# Patient Record
Sex: FEMALE | Race: WHITE | NOT HISPANIC OR LATINO | Employment: UNEMPLOYED | URBAN - METROPOLITAN AREA
[De-identification: names, ages, dates, MRNs, and addresses within clinical notes are randomized per-mention and may not be internally consistent; named-entity substitution may affect disease eponyms.]

---

## 2023-04-06 ENCOUNTER — TELEPHONE (OUTPATIENT)
Dept: PRIMARY CARE | Facility: CLINIC | Age: 83
End: 2023-04-06
Payer: COMMERCIAL

## 2023-04-06 NOTE — TELEPHONE ENCOUNTER
Rx Refill Request Telephone Encounter    Name:  Марина Lozano  :  [id number]  Medication Name:  hydrochlorothiazide 12.5    [facility]   Date of last appointment:    Date of next appointment:    Best number to reach patient:            Rx Refill Request Telephone Encounter    Name:  Марина Lozano  :  [id number]  Medication Name:  omeprazole 20 mg     [facility]   Date of last appointment:    Date of next appointment:  /  Best number to reach patient:      Rx Refill Request Telephone Encounter    Name:  Марина Lozano  :  [id number]  Medication Name:  pravastatin 20 mg     [facility]  Date of last appointment:    Date of next appointment:    Best number to reach patient:            Rx Refill Request Telephone Encounter    Name:  Марина Lozano  :  [id number]  Medication Name:  amlodipine 10-20 mg       [facility]   Date of last appointment:    Date of next appointment:    Best number to reach patient:

## 2023-04-12 DIAGNOSIS — K29.60 REFLUX GASTRITIS: ICD-10-CM

## 2023-04-12 DIAGNOSIS — I10 HYPERTENSION, UNSPECIFIED TYPE: Primary | ICD-10-CM

## 2023-04-12 DIAGNOSIS — E78.5 HYPERLIPIDEMIA, UNSPECIFIED HYPERLIPIDEMIA TYPE: ICD-10-CM

## 2023-04-12 RX ORDER — AMLODIPINE AND BENAZEPRIL HYDROCHLORIDE 10; 20 MG/1; MG/1
1 CAPSULE ORAL DAILY
Qty: 30 CAPSULE | Refills: 11 | Status: SHIPPED | OUTPATIENT
Start: 2023-04-12 | End: 2023-04-18 | Stop reason: SDUPTHER

## 2023-04-12 RX ORDER — OMEPRAZOLE 20 MG/1
20 TABLET, DELAYED RELEASE ORAL DAILY
Qty: 30 TABLET | Refills: 11 | COMMUNITY
Start: 2023-04-12 | End: 2023-04-18 | Stop reason: SDUPTHER

## 2023-04-12 RX ORDER — PRAVASTATIN SODIUM 20 MG/1
20 TABLET ORAL DAILY
Qty: 30 TABLET | Refills: 5 | Status: SHIPPED | OUTPATIENT
Start: 2023-04-12 | End: 2023-04-18 | Stop reason: SDUPTHER

## 2023-04-12 RX ORDER — HYDROCHLOROTHIAZIDE 12.5 MG/1
12.5 TABLET ORAL DAILY
Qty: 30 TABLET | Refills: 0 | Status: SHIPPED | OUTPATIENT
Start: 2023-04-12 | End: 2023-04-18 | Stop reason: SDUPTHER

## 2023-04-12 NOTE — PROGRESS NOTES
Patient requested refills on medications to be sent to [facility]. She has follow up appointment scheduled 04/18/2023.

## 2023-04-17 PROBLEM — C14.0 THROAT CANCER (MULTI): Status: ACTIVE | Noted: 2023-04-17

## 2023-04-17 PROBLEM — L29.9 PRURITUS: Status: ACTIVE | Noted: 2023-04-17

## 2023-04-17 PROBLEM — N17.9 AKI (ACUTE KIDNEY INJURY) (CMS-HCC): Status: ACTIVE | Noted: 2023-04-17

## 2023-04-17 PROBLEM — E78.5 HYPERLIPEMIA: Status: ACTIVE | Noted: 2023-04-17

## 2023-04-17 PROBLEM — K21.9 GASTRO-ESOPHAGEAL REFLUX: Status: ACTIVE | Noted: 2023-04-17

## 2023-04-17 PROBLEM — E55.9 VITAMIN D DEFICIENCY: Status: ACTIVE | Noted: 2023-04-17

## 2023-04-17 PROBLEM — I10 HYPERTENSION: Status: ACTIVE | Noted: 2023-04-17

## 2023-04-17 PROBLEM — E11.9 TYPE 2 DIABETES MELLITUS (MULTI): Status: ACTIVE | Noted: 2023-04-17

## 2023-04-17 RX ORDER — BLOOD-GLUCOSE METER
EACH MISCELLANEOUS
COMMUNITY
End: 2024-04-18 | Stop reason: SDUPTHER

## 2023-04-17 RX ORDER — CHOLECALCIFEROL (VITAMIN D3) 50 MCG
1 TABLET ORAL EVERY OTHER DAY
COMMUNITY

## 2023-04-17 RX ORDER — METFORMIN HYDROCHLORIDE 500 MG/1
500 TABLET ORAL
COMMUNITY
End: 2023-12-12

## 2023-04-17 RX ORDER — GLIPIZIDE 5 MG/1
1 TABLET ORAL 2 TIMES DAILY
COMMUNITY
Start: 2020-03-09 | End: 2024-03-27

## 2023-04-17 RX ORDER — LANCETS 33 GAUGE
EACH MISCELLANEOUS
COMMUNITY
Start: 2023-01-17 | End: 2024-04-18 | Stop reason: SDUPTHER

## 2023-04-17 RX ORDER — LANCETS 30 GAUGE
EACH MISCELLANEOUS
COMMUNITY
Start: 2022-06-13

## 2023-04-17 RX ORDER — PIOGLITAZONEHYDROCHLORIDE 15 MG/1
1 TABLET ORAL DAILY
COMMUNITY
Start: 2020-03-17 | End: 2024-04-18 | Stop reason: SDUPTHER

## 2023-04-18 ENCOUNTER — OFFICE VISIT (OUTPATIENT)
Dept: PRIMARY CARE | Facility: CLINIC | Age: 83
End: 2023-04-18
Payer: MEDICARE

## 2023-04-18 VITALS
HEART RATE: 63 BPM | WEIGHT: 191 LBS | HEIGHT: 62 IN | SYSTOLIC BLOOD PRESSURE: 138 MMHG | OXYGEN SATURATION: 98 % | DIASTOLIC BLOOD PRESSURE: 74 MMHG | BODY MASS INDEX: 35.15 KG/M2

## 2023-04-18 DIAGNOSIS — K29.60 REFLUX GASTRITIS: ICD-10-CM

## 2023-04-18 DIAGNOSIS — I10 HYPERTENSION, UNSPECIFIED TYPE: Primary | ICD-10-CM

## 2023-04-18 DIAGNOSIS — Z13.29 SCREENING FOR THYROID DISORDER: ICD-10-CM

## 2023-04-18 DIAGNOSIS — K21.9 GASTROESOPHAGEAL REFLUX DISEASE WITHOUT ESOPHAGITIS: ICD-10-CM

## 2023-04-18 DIAGNOSIS — E11.69 TYPE 2 DIABETES MELLITUS WITH OTHER SPECIFIED COMPLICATION, WITHOUT LONG-TERM CURRENT USE OF INSULIN (MULTI): ICD-10-CM

## 2023-04-18 DIAGNOSIS — E78.5 HYPERLIPIDEMIA, UNSPECIFIED HYPERLIPIDEMIA TYPE: ICD-10-CM

## 2023-04-18 DIAGNOSIS — E55.9 VITAMIN D DEFICIENCY: ICD-10-CM

## 2023-04-18 PROCEDURE — 3078F DIAST BP <80 MM HG: CPT | Performed by: NURSE PRACTITIONER

## 2023-04-18 PROCEDURE — 1159F MED LIST DOCD IN RCRD: CPT | Performed by: NURSE PRACTITIONER

## 2023-04-18 PROCEDURE — 1160F RVW MEDS BY RX/DR IN RCRD: CPT | Performed by: NURSE PRACTITIONER

## 2023-04-18 PROCEDURE — 3075F SYST BP GE 130 - 139MM HG: CPT | Performed by: NURSE PRACTITIONER

## 2023-04-18 PROCEDURE — 99214 OFFICE O/P EST MOD 30 MIN: CPT | Performed by: NURSE PRACTITIONER

## 2023-04-18 PROCEDURE — 1036F TOBACCO NON-USER: CPT | Performed by: NURSE PRACTITIONER

## 2023-04-18 RX ORDER — OMEPRAZOLE 20 MG/1
20 TABLET, DELAYED RELEASE ORAL DAILY
Qty: 90 TABLET | Refills: 1 | Status: SHIPPED | OUTPATIENT
Start: 2023-04-18 | End: 2023-10-29 | Stop reason: SDUPTHER

## 2023-04-18 RX ORDER — AMLODIPINE AND BENAZEPRIL HYDROCHLORIDE 10; 20 MG/1; MG/1
1 CAPSULE ORAL DAILY
Qty: 90 CAPSULE | Refills: 1 | Status: SHIPPED | OUTPATIENT
Start: 2023-04-18 | End: 2023-10-29 | Stop reason: SDUPTHER

## 2023-04-18 RX ORDER — HYDROCHLOROTHIAZIDE 12.5 MG/1
12.5 TABLET ORAL DAILY
Qty: 90 TABLET | Refills: 1 | Status: SHIPPED | OUTPATIENT
Start: 2023-04-18 | End: 2023-10-29 | Stop reason: SDUPTHER

## 2023-04-18 RX ORDER — ASCORBATE CALCIUM 500 MG
TABLET ORAL
COMMUNITY
Start: 2022-12-07

## 2023-04-18 RX ORDER — PRAVASTATIN SODIUM 20 MG/1
20 TABLET ORAL DAILY
Qty: 90 TABLET | Refills: 1 | Status: SHIPPED | OUTPATIENT
Start: 2023-04-18 | End: 2023-10-29 | Stop reason: SDUPTHER

## 2023-04-18 ASSESSMENT — ENCOUNTER SYMPTOMS
RESPIRATORY NEGATIVE: 1
CONSTITUTIONAL NEGATIVE: 1
CARDIOVASCULAR NEGATIVE: 1
GASTROINTESTINAL NEGATIVE: 1
PSYCHIATRIC NEGATIVE: 1

## 2023-04-18 NOTE — PROGRESS NOTES
"Subjective   Patient ID: Марина Lozano is a 82 y.o. female who presents for Follow-up (6m fu ) and Med Refill ([facility]'t [facility] omeprazole).    HPI   Patient here for follow up, last seen on 09/29/2022.  Lives in [facility].   Current concerns- No new concerns.  Chronic concerns: DMT2, Hyperlipidemia, Hypertension, GERD, Vitamin D deficiency,  Specialist  - Endocrinology Dr. Hsu,   - ENT  Labs 05/31/2022     Review of Systems   Constitutional: Negative.    HENT: Negative.     Respiratory: Negative.     Cardiovascular: Negative.    Gastrointestinal: Negative.    Genitourinary: Negative.    Psychiatric/Behavioral: Negative.         Objective   /74 (BP Location: Right arm, Patient Position: Sitting, BP Cuff Size: Adult)   Pulse 63   Ht 1.575 m (5' 2\")   Wt 86.6 kg (191 lb)   SpO2 98%   BMI 34.93 kg/m²     Physical Exam  Vitals reviewed.   Constitutional:       Appearance: Normal appearance.   HENT:      Right Ear: Ear canal normal.      Left Ear: Tympanic membrane, ear canal and external ear normal.      Ears:      Comments: Scar tissue present right TM     Nose: Nose normal.      Mouth/Throat:      Mouth: Mucous membranes are moist.   Neck:      Thyroid: No thyromegaly or thyroid tenderness.   Cardiovascular:      Rate and Rhythm: Normal rate and regular rhythm.      Pulses: Normal pulses.      Heart sounds: Normal heart sounds.   Pulmonary:      Effort: Pulmonary effort is normal.      Breath sounds: Normal breath sounds.   Musculoskeletal:         General: Normal range of motion.   Lymphadenopathy:      Cervical: No cervical adenopathy.   Skin:     General: Skin is warm.   Neurological:      General: No focal deficit present.      Mental Status: She is alert.   Psychiatric:         Mood and Affect: Mood normal.         Behavior: Behavior normal.         Judgment: Judgment normal.       Assessment/Plan   Health Maintenance  Labs -  05/31/2022 - Orders provided " today   Influenza- yearly  Prevnar 13/20- Pneumovax 03/24/2022  Shingrix- received Zostavax  Colonoscopy- declines further screening   Mammogram- declines further screening   DEXA BONE Density declines further screening     Diagnoses and all orders for this visit:  Hypertension, unspecified type  -     amLODIPine-benazepriL (LotreL) 10-20 mg capsule; Take 1 capsule by mouth once daily. (Refilled 90+1)  -     hydroCHLOROthiazide (HYDRODiuril) 12.5 mg tablet; Take 1 tablet (12.5 mg) by mouth once daily. (Refilled 90+1)  -     Albumin , Urine Random; Future  -     Comprehensive Metabolic Panel; Future  -     CBC and Auto Differential; Future  Hyperlipidemia, unspecified hyperlipidemia type  -     pravastatin (Pravachol) 20 mg tablet; Take 1 tablet (20 mg) by mouth once daily. (Refilled 90+1)  -     Lipid Panel; Future  Type 2 diabetes mellitus with other specified complication, without long-term current use of insulin (CMS/[facility])  Medications managed by endocrinology. Марина checks BS am , this morning 110  -     Albumin , Urine Random; Future  -     Comprehensive Metabolic Panel; Future  -     Hemoglobin A1C; Future  Gastroesophageal reflux disease without esophagitis  Vitamin D deficiency  -     Vitamin D 1,25 Dihydroxy; Future  Reflux gastritis  -     omeprazole OTC (PriLOSEC OTC) 20 mg EC tablet; Take 1 tablet (20 mg) by mouth once daily. Do not crush, chew, or split.  (Refilled 90+1)  Screening for thyroid disorder  -     TSH with reflex to Free T4 if abnormal; Future    PLAN/FOLLOW UP  SIX MONTHS routine  Lab orders placed to complete soon-> communicate results  (CBC+d, CMP, A1C, lipid panel, TSH with relex, urine albumin, vitamin D)

## 2023-06-13 ENCOUNTER — LAB (OUTPATIENT)
Dept: LAB | Facility: LAB | Age: 83
End: 2023-06-13
Payer: MEDICARE

## 2023-06-13 DIAGNOSIS — Z13.29 SCREENING FOR THYROID DISORDER: ICD-10-CM

## 2023-06-13 DIAGNOSIS — E55.9 VITAMIN D DEFICIENCY: ICD-10-CM

## 2023-06-13 DIAGNOSIS — I10 HYPERTENSION, UNSPECIFIED TYPE: ICD-10-CM

## 2023-06-13 DIAGNOSIS — E11.69 TYPE 2 DIABETES MELLITUS WITH OTHER SPECIFIED COMPLICATION, WITHOUT LONG-TERM CURRENT USE OF INSULIN (MULTI): ICD-10-CM

## 2023-06-13 DIAGNOSIS — E78.5 HYPERLIPIDEMIA, UNSPECIFIED HYPERLIPIDEMIA TYPE: ICD-10-CM

## 2023-06-13 LAB
ALANINE AMINOTRANSFERASE (SGPT) (U/L) IN SER/PLAS: 11 U/L (ref 7–45)
ALBUMIN (G/DL) IN SER/PLAS: 4.3 G/DL (ref 3.4–5)
ALBUMIN (MG/L) IN URINE: 50.1 MG/L
ALBUMIN/CREATININE (UG/MG) IN URINE: 31.1 UG/MG CRT (ref 0–30)
ALKALINE PHOSPHATASE (U/L) IN SER/PLAS: 61 U/L (ref 33–136)
ANION GAP IN SER/PLAS: 13 MMOL/L (ref 10–20)
ASPARTATE AMINOTRANSFERASE (SGOT) (U/L) IN SER/PLAS: 18 U/L (ref 9–39)
BASOPHILS (10*3/UL) IN BLOOD BY AUTOMATED COUNT: 0.02 X10E9/L (ref 0–0.1)
BASOPHILS/100 LEUKOCYTES IN BLOOD BY AUTOMATED COUNT: 0.4 % (ref 0–2)
BILIRUBIN TOTAL (MG/DL) IN SER/PLAS: 0.4 MG/DL (ref 0–1.2)
CALCIUM (MG/DL) IN SER/PLAS: 10.3 MG/DL (ref 8.6–10.3)
CARBON DIOXIDE, TOTAL (MMOL/L) IN SER/PLAS: 27 MMOL/L (ref 21–32)
CHLORIDE (MMOL/L) IN SER/PLAS: 105 MMOL/L (ref 98–107)
CHOLESTEROL (MG/DL) IN SER/PLAS: 173 MG/DL (ref 0–199)
CHOLESTEROL IN HDL (MG/DL) IN SER/PLAS: 61.1 MG/DL
CHOLESTEROL/HDL RATIO: 2.8
CREATININE (MG/DL) IN SER/PLAS: 1.52 MG/DL (ref 0.5–1.05)
CREATININE (MG/DL) IN URINE: 161 MG/DL (ref 20–320)
EOSINOPHILS (10*3/UL) IN BLOOD BY AUTOMATED COUNT: 0.22 X10E9/L (ref 0–0.4)
EOSINOPHILS/100 LEUKOCYTES IN BLOOD BY AUTOMATED COUNT: 4.3 % (ref 0–6)
ERYTHROCYTE DISTRIBUTION WIDTH (RATIO) BY AUTOMATED COUNT: 14.6 % (ref 11.5–14.5)
ERYTHROCYTE MEAN CORPUSCULAR HEMOGLOBIN CONCENTRATION (G/DL) BY AUTOMATED: 30.5 G/DL (ref 32–36)
ERYTHROCYTE MEAN CORPUSCULAR VOLUME (FL) BY AUTOMATED COUNT: 90 FL (ref 80–100)
ERYTHROCYTES (10*6/UL) IN BLOOD BY AUTOMATED COUNT: 3.85 X10E12/L (ref 4–5.2)
ESTIMATED AVERAGE GLUCOSE FOR HBA1C: 154 MG/DL
GFR FEMALE: 33 ML/MIN/1.73M2
GLUCOSE (MG/DL) IN SER/PLAS: 123 MG/DL (ref 74–99)
HEMATOCRIT (%) IN BLOOD BY AUTOMATED COUNT: 34.7 % (ref 36–46)
HEMOGLOBIN (G/DL) IN BLOOD: 10.6 G/DL (ref 12–16)
HEMOGLOBIN A1C/HEMOGLOBIN TOTAL IN BLOOD: 7 %
IMMATURE GRANULOCYTES/100 LEUKOCYTES IN BLOOD BY AUTOMATED COUNT: 0.4 % (ref 0–0.9)
LDL: 81 MG/DL (ref 0–99)
LEUKOCYTES (10*3/UL) IN BLOOD BY AUTOMATED COUNT: 5.1 X10E9/L (ref 4.4–11.3)
LYMPHOCYTES (10*3/UL) IN BLOOD BY AUTOMATED COUNT: 0.97 X10E9/L (ref 0.8–3)
LYMPHOCYTES/100 LEUKOCYTES IN BLOOD BY AUTOMATED COUNT: 19.2 % (ref 13–44)
MONOCYTES (10*3/UL) IN BLOOD BY AUTOMATED COUNT: 0.33 X10E9/L (ref 0.05–0.8)
MONOCYTES/100 LEUKOCYTES IN BLOOD BY AUTOMATED COUNT: 6.5 % (ref 2–10)
NEUTROPHILS (10*3/UL) IN BLOOD BY AUTOMATED COUNT: 3.5 X10E9/L (ref 1.6–5.5)
NEUTROPHILS/100 LEUKOCYTES IN BLOOD BY AUTOMATED COUNT: 69.2 % (ref 40–80)
PLATELETS (10*3/UL) IN BLOOD AUTOMATED COUNT: 180 X10E9/L (ref 150–450)
POTASSIUM (MMOL/L) IN SER/PLAS: 5.4 MMOL/L (ref 3.5–5.3)
PROTEIN TOTAL: 7.5 G/DL (ref 6.4–8.2)
SODIUM (MMOL/L) IN SER/PLAS: 140 MMOL/L (ref 136–145)
THYROTROPIN (MIU/L) IN SER/PLAS BY DETECTION LIMIT <= 0.05 MIU/L: 2.55 MIU/L (ref 0.44–3.98)
TRIGLYCERIDE (MG/DL) IN SER/PLAS: 156 MG/DL (ref 0–149)
UREA NITROGEN (MG/DL) IN SER/PLAS: 28 MG/DL (ref 6–23)
VLDL: 31 MG/DL (ref 0–40)

## 2023-06-13 PROCEDURE — 80061 LIPID PANEL: CPT

## 2023-06-13 PROCEDURE — 80053 COMPREHEN METABOLIC PANEL: CPT

## 2023-06-13 PROCEDURE — 82652 VIT D 1 25-DIHYDROXY: CPT

## 2023-06-13 PROCEDURE — 83036 HEMOGLOBIN GLYCOSYLATED A1C: CPT

## 2023-06-13 PROCEDURE — 36415 COLL VENOUS BLD VENIPUNCTURE: CPT

## 2023-06-13 PROCEDURE — 82043 UR ALBUMIN QUANTITATIVE: CPT

## 2023-06-13 PROCEDURE — 84443 ASSAY THYROID STIM HORMONE: CPT

## 2023-06-13 PROCEDURE — 85025 COMPLETE CBC W/AUTO DIFF WBC: CPT

## 2023-06-13 PROCEDURE — 82570 ASSAY OF URINE CREATININE: CPT

## 2023-06-16 LAB — VITAMIN D 1,25-DIHYDROXY: 36.6 PG/ML (ref 19.9–79.3)

## 2023-10-26 ENCOUNTER — TELEPHONE (OUTPATIENT)
Dept: PRIMARY CARE | Facility: CLINIC | Age: 83
End: 2023-10-26
Payer: COMMERCIAL

## 2023-10-26 NOTE — TELEPHONE ENCOUNTER
Pt called requesting refill    Hydrochlorothiazide  Amlodipine  Omeprazole  Pravastatin     [facility]     LOV 4/8/23  No future ov scheduled.

## 2023-10-29 DIAGNOSIS — E78.5 HYPERLIPIDEMIA, UNSPECIFIED HYPERLIPIDEMIA TYPE: ICD-10-CM

## 2023-10-29 DIAGNOSIS — K29.60 REFLUX GASTRITIS: ICD-10-CM

## 2023-10-29 DIAGNOSIS — I10 HYPERTENSION, UNSPECIFIED TYPE: ICD-10-CM

## 2023-10-29 RX ORDER — HYDROCHLOROTHIAZIDE 12.5 MG/1
12.5 TABLET ORAL DAILY
Qty: 90 TABLET | Refills: 0 | Status: SHIPPED | OUTPATIENT
Start: 2023-10-29 | End: 2024-02-29 | Stop reason: SDUPTHER

## 2023-10-29 RX ORDER — PRAVASTATIN SODIUM 20 MG/1
20 TABLET ORAL DAILY
Qty: 90 TABLET | Refills: 0 | Status: SHIPPED | OUTPATIENT
Start: 2023-10-29 | End: 2024-02-29 | Stop reason: SDUPTHER

## 2023-10-29 RX ORDER — AMLODIPINE AND BENAZEPRIL HYDROCHLORIDE 10; 20 MG/1; MG/1
1 CAPSULE ORAL DAILY
Qty: 90 CAPSULE | Refills: 0 | Status: SHIPPED | OUTPATIENT
Start: 2023-10-29 | End: 2024-04-05 | Stop reason: SDUPTHER

## 2023-10-29 RX ORDER — OMEPRAZOLE 20 MG/1
20 TABLET, DELAYED RELEASE ORAL DAILY
Qty: 90 TABLET | Refills: 0 | Status: SHIPPED | OUTPATIENT
Start: 2023-10-29 | End: 2023-11-02 | Stop reason: SDUPTHER

## 2023-11-02 ENCOUNTER — TELEPHONE (OUTPATIENT)
Dept: PRIMARY CARE | Facility: CLINIC | Age: 83
End: 2023-11-02
Payer: COMMERCIAL

## 2023-11-02 RX ORDER — OMEPRAZOLE 20 MG/1
20 TABLET, DELAYED RELEASE ORAL DAILY
Qty: 90 TABLET | Refills: 0 | Status: SHIPPED | OUTPATIENT
Start: 2023-11-02 | End: 2023-11-28 | Stop reason: ALTCHOICE

## 2023-11-02 NOTE — TELEPHONE ENCOUNTER
Pt called stating [facility] didn't get the refill Omeprazole from the other day. I did try twice to call it in and give them a verbal but no one answered and It didn't send me to a voice mail. The voice automated message kept telling me to send all prescriptions electronically as it's their best and fasted way to get medications. Can you send it in again if it will let you. If not I will keep calling or send over a fax message maybe. Sorry I did try.

## 2023-11-28 ENCOUNTER — TELEPHONE (OUTPATIENT)
Dept: PRIMARY CARE | Facility: CLINIC | Age: 83
End: 2023-11-28
Payer: COMMERCIAL

## 2023-11-28 DIAGNOSIS — K21.9 GASTROESOPHAGEAL REFLUX DISEASE WITHOUT ESOPHAGITIS: Primary | ICD-10-CM

## 2023-11-28 RX ORDER — OMEPRAZOLE 20 MG/1
20 CAPSULE, DELAYED RELEASE ORAL
Qty: 90 CAPSULE | Refills: 1 | Status: SHIPPED | OUTPATIENT
Start: 2023-11-28 | End: 2024-05-23

## 2023-11-28 NOTE — TELEPHONE ENCOUNTER
Pt [facility] but it was for Tablets not Capsules , pt takes capsules could we resend rx

## 2023-12-06 ENCOUNTER — OFFICE VISIT (OUTPATIENT)
Dept: PRIMARY CARE | Facility: CLINIC | Age: 83
End: 2023-12-06
Payer: MEDICARE

## 2023-12-06 VITALS
WEIGHT: 191 LBS | OXYGEN SATURATION: 97 % | SYSTOLIC BLOOD PRESSURE: 122 MMHG | DIASTOLIC BLOOD PRESSURE: 72 MMHG | BODY MASS INDEX: 33.84 KG/M2 | TEMPERATURE: 97.2 F | HEIGHT: 63 IN | HEART RATE: 65 BPM

## 2023-12-06 DIAGNOSIS — E55.9 VITAMIN D DEFICIENCY: ICD-10-CM

## 2023-12-06 DIAGNOSIS — I10 HYPERTENSION, UNSPECIFIED TYPE: ICD-10-CM

## 2023-12-06 DIAGNOSIS — Z00.00 MEDICARE ANNUAL WELLNESS VISIT, SUBSEQUENT: Primary | ICD-10-CM

## 2023-12-06 DIAGNOSIS — E11.69 TYPE 2 DIABETES MELLITUS WITH OTHER SPECIFIED COMPLICATION, WITHOUT LONG-TERM CURRENT USE OF INSULIN (MULTI): ICD-10-CM

## 2023-12-06 DIAGNOSIS — K21.9 GASTROESOPHAGEAL REFLUX DISEASE WITHOUT ESOPHAGITIS: ICD-10-CM

## 2023-12-06 DIAGNOSIS — E78.5 HYPERLIPIDEMIA, UNSPECIFIED HYPERLIPIDEMIA TYPE: ICD-10-CM

## 2023-12-06 PROCEDURE — 1160F RVW MEDS BY RX/DR IN RCRD: CPT | Performed by: NURSE PRACTITIONER

## 2023-12-06 PROCEDURE — 3078F DIAST BP <80 MM HG: CPT | Performed by: NURSE PRACTITIONER

## 2023-12-06 PROCEDURE — 1170F FXNL STATUS ASSESSED: CPT | Performed by: NURSE PRACTITIONER

## 2023-12-06 PROCEDURE — 3074F SYST BP LT 130 MM HG: CPT | Performed by: NURSE PRACTITIONER

## 2023-12-06 PROCEDURE — 1036F TOBACCO NON-USER: CPT | Performed by: NURSE PRACTITIONER

## 2023-12-06 PROCEDURE — G0439 PPPS, SUBSEQ VISIT: HCPCS | Performed by: NURSE PRACTITIONER

## 2023-12-06 PROCEDURE — 1159F MED LIST DOCD IN RCRD: CPT | Performed by: NURSE PRACTITIONER

## 2023-12-06 ASSESSMENT — ENCOUNTER SYMPTOMS
PSYCHIATRIC NEGATIVE: 1
GASTROINTESTINAL NEGATIVE: 1
CARDIOVASCULAR NEGATIVE: 1
ENDOCRINE NEGATIVE: 1
MUSCULOSKELETAL NEGATIVE: 1
NEUROLOGICAL NEGATIVE: 1
RESPIRATORY NEGATIVE: 1
CONSTITUTIONAL NEGATIVE: 1
LOSS OF SENSATION IN FEET: 0
HEMATOLOGIC/LYMPHATIC NEGATIVE: 1
OCCASIONAL FEELINGS OF UNSTEADINESS: 0
DEPRESSION: 0

## 2023-12-06 ASSESSMENT — ACTIVITIES OF DAILY LIVING (ADL)
TAKING_MEDICATION: INDEPENDENT
DRESSING: INDEPENDENT
BATHING: INDEPENDENT
DOING_HOUSEWORK: INDEPENDENT
GROCERY_SHOPPING: INDEPENDENT
MANAGING_FINANCES: INDEPENDENT

## 2023-12-06 ASSESSMENT — PATIENT HEALTH QUESTIONNAIRE - PHQ9
2. FEELING DOWN, DEPRESSED OR HOPELESS: NOT AT ALL
SUM OF ALL RESPONSES TO PHQ9 QUESTIONS 1 AND 2: 0
1. LITTLE INTEREST OR PLEASURE IN DOING THINGS: NOT AT ALL

## 2023-12-06 NOTE — PATIENT INSTRUCTIONS
Obtain HIGH DOSE FLU vaccine at pharmacy.  RSV vaccine at the pharmacy   Do LABS IN JUNE before next appointment.   Lab orders to be completed for next appointment review. Labs are fasting 10-12 hours do not eat, please drink adequate water prior to lab draw.

## 2023-12-06 NOTE — PROGRESS NOTES
"Subjective   Patient ID: Марина Lozano is a 83 y.o. female who presents for Medicare Annual Wellness Visit Subsequent.    HPI   Patient here for Medicare Wellness. Last seen on 04/18/2023   Lives in [facility].   Blood sugar checked at home once daily running low 100's   Chronic concerns: DMT2, Hyperlipidemia, Hypertension, GERD, Vitamin D deficiency, Decreased kidney function,  Specialist  - Endocrinology Dr. Hsu,   - ENT  Labs 06/13/2023     Review of Systems   Constitutional: Negative.    HENT: Negative.     Eyes:         Eye exam is scheduled.    Respiratory: Negative.     Cardiovascular: Negative.    Gastrointestinal: Negative.    Endocrine: Negative.    Genitourinary: Negative.    Musculoskeletal: Negative.    Skin: Negative.    Neurological: Negative.    Hematological: Negative.    Psychiatric/Behavioral: Negative.       Objective   /72   Pulse 65   Temp 36.2 °C (97.2 °F)   Ht 1.588 m (5' 2.5\")   Wt 86.6 kg (191 lb)   SpO2 97%   BMI 34.38 kg/m²   Weight stable at 191 lbs.     Physical Exam  Vitals reviewed.   Constitutional:       Appearance: She is obese.   HENT:      Right Ear: Tympanic membrane and ear canal normal.      Left Ear: Tympanic membrane and ear canal normal.      Mouth/Throat:      Mouth: Mucous membranes are moist.   Neck:      Thyroid: No thyromegaly.      Vascular: No carotid bruit.   Cardiovascular:      Rate and Rhythm: Normal rate and regular rhythm.      Heart sounds: Normal heart sounds.   Pulmonary:      Effort: Pulmonary effort is normal.      Breath sounds: Normal breath sounds.   Musculoskeletal:      Cervical back: Normal range of motion.   Neurological:      General: No focal deficit present.      Mental Status: She is alert.   Psychiatric:         Mood and Affect: Mood normal.         Behavior: Behavior normal.         Judgment: Judgment normal.       Assessment/Plan   Labs -  06/13/2023   Influenza- advised to receive high dose flu  Prevnar " 13/20- Pneumovax 03/24/2022  Shingrix- received Zostavax  Colonoscopy- declines further screening   Mammogram- declines further screening   DEXA BONE Density declines further screening     Diagnoses and all orders for this visit:  Medicare annual wellness visit, subsequent  Hypertension, unspecified type  -     CBC and Auto Differential; Future  -     Comprehensive Metabolic Panel; Future  Type 2 diabetes mellitus with other specified complication, without long-term current use of insulin (CMS/[facility])  -     Comprehensive Metabolic Panel; Future  -     Hemoglobin A1C; Future  Vitamin D deficiency  -     Vitamin D 25-Hydroxy,Total (for eval of Vitamin D levels); Future  Hyperlipidemia, unspecified hyperlipidemia type  -     Lipid Panel; Future  Gastroesophageal reflux disease without esophagitis    PLAN: follow up 6 months  Lab orders in EMR to complete for next appt.

## 2023-12-11 ENCOUNTER — APPOINTMENT (OUTPATIENT)
Dept: ENDOCRINOLOGY | Facility: CLINIC | Age: 83
End: 2023-12-11
Payer: MEDICARE

## 2023-12-12 DIAGNOSIS — E11.69 TYPE 2 DIABETES MELLITUS WITH OTHER SPECIFIED COMPLICATION, WITHOUT LONG-TERM CURRENT USE OF INSULIN (MULTI): Primary | ICD-10-CM

## 2023-12-12 RX ORDER — METFORMIN HYDROCHLORIDE 500 MG/1
500 TABLET ORAL
Qty: 180 TABLET | Refills: 1 | Status: SHIPPED | OUTPATIENT
Start: 2023-12-12 | End: 2024-04-18 | Stop reason: SDUPTHER

## 2024-02-29 ENCOUNTER — TELEPHONE (OUTPATIENT)
Dept: PRIMARY CARE | Facility: CLINIC | Age: 84
End: 2024-02-29
Payer: COMMERCIAL

## 2024-02-29 DIAGNOSIS — I10 HYPERTENSION, UNSPECIFIED TYPE: ICD-10-CM

## 2024-02-29 DIAGNOSIS — E78.5 HYPERLIPIDEMIA, UNSPECIFIED HYPERLIPIDEMIA TYPE: ICD-10-CM

## 2024-02-29 RX ORDER — PRAVASTATIN SODIUM 20 MG/1
20 TABLET ORAL DAILY
Qty: 90 TABLET | Refills: 0 | Status: SHIPPED | OUTPATIENT
Start: 2024-02-29

## 2024-02-29 RX ORDER — HYDROCHLOROTHIAZIDE 12.5 MG/1
12.5 TABLET ORAL DAILY
Qty: 90 TABLET | Refills: 0 | Status: SHIPPED | OUTPATIENT
Start: 2024-02-29 | End: 2024-05-29

## 2024-02-29 NOTE — TELEPHONE ENCOUNTER
KELL patient called requesting refill please to be sent to [facility] (verified on file)     Pravastatin 20 mg 1 PO every day  Hydrochlorothiazide 12.5 mg 1 PO every day    LOV 12/6/23 NOV None scheduled yet. Due to come in in June.

## 2024-03-27 DIAGNOSIS — E11.69 TYPE 2 DIABETES MELLITUS WITH OTHER SPECIFIED COMPLICATION, WITHOUT LONG-TERM CURRENT USE OF INSULIN (MULTI): Primary | ICD-10-CM

## 2024-03-27 RX ORDER — GLIPIZIDE 5 MG/1
5 TABLET ORAL 2 TIMES DAILY
Qty: 180 TABLET | Refills: 0 | Status: SHIPPED | OUTPATIENT
Start: 2024-03-27 | End: 2024-04-18 | Stop reason: SDUPTHER

## 2024-04-05 ENCOUNTER — TELEPHONE (OUTPATIENT)
Dept: PRIMARY CARE | Facility: CLINIC | Age: 84
End: 2024-04-05
Payer: COMMERCIAL

## 2024-04-05 DIAGNOSIS — I10 HYPERTENSION, UNSPECIFIED TYPE: ICD-10-CM

## 2024-04-05 RX ORDER — AMLODIPINE AND BENAZEPRIL HYDROCHLORIDE 10; 20 MG/1; MG/1
1 CAPSULE ORAL DAILY
Qty: 90 CAPSULE | Refills: 0 | Status: SHIPPED | OUTPATIENT
Start: 2024-04-05

## 2024-04-15 DIAGNOSIS — E11.69 TYPE 2 DIABETES MELLITUS WITH OTHER SPECIFIED COMPLICATION, WITHOUT LONG-TERM CURRENT USE OF INSULIN (MULTI): Primary | ICD-10-CM

## 2024-04-15 RX ORDER — PIOGLITAZONEHYDROCHLORIDE 15 MG/1
15 TABLET ORAL DAILY
Qty: 90 TABLET | Refills: 3 | OUTPATIENT
Start: 2024-04-15

## 2024-04-17 NOTE — PROGRESS NOTES
"Subjective   Марина Lozano is a 83 y.o. female who presents for follow-up of Type 2 diabetes mellitus. The initial diagnosis of diabetes was made in 2005 .     She has had no major changes to her health since her last appointment.    Known complications due to diabetes included CAD s/p . and chronic kidney disease    Cardiovascular risk factors include advanced age (older than 55 for men, 65 for women), diabetes mellitus, microalbuminuria, and obesity (BMI >= 30 kg/m2). The patient is not on an ACE inhibitor or angiotensin II receptor blocker.  The patient has not been previously hospitalized due to diabetic ketoacidosis.     Current symptoms/problems include none. Her clinical course has been stable.     The patient is currently checking the blood glucose.      Patient is using: glucometer  Forgot glucose log today     Hypoglycemia frequency: Denies  Hypoglycemia awareness: N/A      Current Medication Regimen  Glipizide 5mg twice daily  Metformin 500mg twice daily  Actos 15mg once daily      MEALS: needs to have dental work done and thus salads have been limited   Breakfast 10am- saleem flakes cereal, bagel, banana, toast   Dinner 4-6pm - vegetables, potato, meat, salad, fish, ground meat, ham  Snacks - ice cream at 8pm   Beverages - water, Gatorade zero, Vitamin water, coffee      Denies exercise regimen     Review of Systems   All other systems reviewed and are negative.      Objective   /78 (BP Location: Right arm, Patient Position: Sitting, BP Cuff Size: Adult)   Pulse 59   Ht 1.575 m (5' 2\")   Wt 82.6 kg (182 lb)   BMI 33.29 kg/m²   Physical Exam  Vitals and nursing note reviewed.   Constitutional:       General: She is not in acute distress.     Appearance: Normal appearance. She is normal weight.   HENT:      Head: Normocephalic and atraumatic.      Nose: Nose normal.      Mouth/Throat:      Mouth: Mucous membranes are moist.   Eyes:      Extraocular Movements: Extraocular movements intact. " "  Cardiovascular:      Rate and Rhythm: Normal rate and regular rhythm.   Pulmonary:      Effort: Pulmonary effort is normal.      Breath sounds: Normal breath sounds.   Musculoskeletal:         General: Normal range of motion.   Skin:     General: Skin is warm.   Neurological:      Mental Status: She is alert and oriented to person, place, and time.   Psychiatric:         Mood and Affect: Mood normal.       Lab Review  Glucose (mg/dL)   Date Value   06/13/2023 123 (H)   03/22/2022 142 (H)   09/03/2021 119 (H)     Hemoglobin A1C (%)   Date Value   06/13/2023 7.0 (A)   05/31/2022 7.1 (A)   09/03/2021 6.9 (A)     Bicarbonate (mmol/L)   Date Value   06/13/2023 27   03/22/2022 26   09/03/2021 27     Urea Nitrogen (mg/dL)   Date Value   06/13/2023 28 (H)   03/22/2022 30 (H)   09/03/2021 32 (H)     Creatinine (mg/dL)   Date Value   06/13/2023 1.52 (H)   03/22/2022 1.42 (H)   09/03/2021 1.33 (H)     Lab Results   Component Value Date    CHOL 173 06/13/2023    CHOL 145 05/31/2022    CHOL 150 09/03/2021     Lab Results   Component Value Date    HDL 61.1 06/13/2023    HDL 55.2 05/31/2022    HDL 57.3 09/03/2021     No results found for: \"LDLCALC\"  Lab Results   Component Value Date    TRIG 156 (H) 06/13/2023    TRIG 147 05/31/2022    TRIG 103 09/03/2021     Health Maintenance:   Foot Exam: April 2021  Eye Exam:  Within the last 2 years   Urine Albumin: June 13, 2023    Assessment/Plan   83 year old female presents for follow up for type II DM. She is on a statin. Her blood pressure is at goal.      Type 2 diabetes mellitus (Multi)  To continue Metformin 500mg twice daily  To continue Glipizide 5mg twice daily  To continue Actos 15mg once daily  To obtain blood tests as ordered by PCP   For a diabetic dilated eye examinationon  For follow up in 6 months    "

## 2024-04-17 NOTE — PATIENT INSTRUCTIONS
Thank you for choosing [facility]  for your health care needs.  If you have any questions, concerns or medical needs, please feel free to contact our office at [phone].    Please ensure you complete your blood work one week before the next scheduled appointment.    To continue Metformin 500mg twice daily  To continue Glipizide 5mg twice daily  To continue Actos 15mg once daily  To obtain blood tests as ordered by PCP   For a diabetic dilated eye examinationon  For follow up in 6 months

## 2024-04-18 ENCOUNTER — OFFICE VISIT (OUTPATIENT)
Dept: ENDOCRINOLOGY | Facility: CLINIC | Age: 84
End: 2024-04-18
Payer: MEDICARE

## 2024-04-18 VITALS
HEART RATE: 59 BPM | HEIGHT: 62 IN | WEIGHT: 182 LBS | BODY MASS INDEX: 33.49 KG/M2 | SYSTOLIC BLOOD PRESSURE: 130 MMHG | DIASTOLIC BLOOD PRESSURE: 78 MMHG

## 2024-04-18 DIAGNOSIS — E11.69 TYPE 2 DIABETES MELLITUS WITH OTHER SPECIFIED COMPLICATION, WITHOUT LONG-TERM CURRENT USE OF INSULIN (MULTI): ICD-10-CM

## 2024-04-18 PROCEDURE — 1160F RVW MEDS BY RX/DR IN RCRD: CPT | Performed by: INTERNAL MEDICINE

## 2024-04-18 PROCEDURE — 3075F SYST BP GE 130 - 139MM HG: CPT | Performed by: INTERNAL MEDICINE

## 2024-04-18 PROCEDURE — 99213 OFFICE O/P EST LOW 20 MIN: CPT | Performed by: INTERNAL MEDICINE

## 2024-04-18 PROCEDURE — 3078F DIAST BP <80 MM HG: CPT | Performed by: INTERNAL MEDICINE

## 2024-04-18 PROCEDURE — 1159F MED LIST DOCD IN RCRD: CPT | Performed by: INTERNAL MEDICINE

## 2024-04-18 RX ORDER — PIOGLITAZONEHYDROCHLORIDE 15 MG/1
15 TABLET ORAL DAILY
Qty: 90 TABLET | Refills: 1 | Status: SHIPPED | OUTPATIENT
Start: 2024-04-18 | End: 2024-10-15

## 2024-04-18 RX ORDER — BLOOD-GLUCOSE METER
1 EACH MISCELLANEOUS 2 TIMES DAILY
Qty: 200 STRIP | Refills: 11 | Status: SHIPPED | OUTPATIENT
Start: 2024-04-18 | End: 2025-04-18

## 2024-04-18 RX ORDER — METFORMIN HYDROCHLORIDE 500 MG/1
500 TABLET ORAL
Qty: 180 TABLET | Refills: 1 | Status: SHIPPED | OUTPATIENT
Start: 2024-04-18 | End: 2024-10-15

## 2024-04-18 RX ORDER — GLIPIZIDE 5 MG/1
5 TABLET ORAL 2 TIMES DAILY
Qty: 180 TABLET | Refills: 1 | Status: SHIPPED | OUTPATIENT
Start: 2024-04-18 | End: 2024-10-15

## 2024-04-18 RX ORDER — LANCETS 33 GAUGE
1 EACH MISCELLANEOUS 2 TIMES DAILY
Qty: 200 EACH | Refills: 11 | Status: SHIPPED | OUTPATIENT
Start: 2024-04-18 | End: 2025-04-18

## 2024-04-19 RX ORDER — PIOGLITAZONEHYDROCHLORIDE 15 MG/1
15 TABLET ORAL DAILY
Qty: 90 TABLET | Refills: 1 | Status: SHIPPED | OUTPATIENT
Start: 2024-04-19 | End: 2024-10-16

## 2024-04-22 NOTE — ASSESSMENT & PLAN NOTE
To continue Metformin 500mg twice daily  To continue Glipizide 5mg twice daily  To continue Actos 15mg once daily  To obtain blood tests as ordered by PCP   For a diabetic dilated eye examinationon  For follow up in 6 months

## 2024-10-24 ENCOUNTER — APPOINTMENT (OUTPATIENT)
Dept: ENDOCRINOLOGY | Facility: CLINIC | Age: 84
End: 2024-10-24
Payer: COMMERCIAL

## 2024-10-24 VITALS
HEIGHT: 62 IN | HEART RATE: 55 BPM | WEIGHT: 172 LBS | BODY MASS INDEX: 31.65 KG/M2 | DIASTOLIC BLOOD PRESSURE: 88 MMHG | SYSTOLIC BLOOD PRESSURE: 182 MMHG

## 2024-10-24 DIAGNOSIS — E11.69 TYPE 2 DIABETES MELLITUS WITH OTHER SPECIFIED COMPLICATION, WITHOUT LONG-TERM CURRENT USE OF INSULIN: Primary | ICD-10-CM

## 2024-10-24 LAB — POC FINGERSTICK BLOOD GLUCOSE: 200 MG/DL (ref 70–100)

## 2024-10-24 PROCEDURE — 3079F DIAST BP 80-89 MM HG: CPT | Performed by: INTERNAL MEDICINE

## 2024-10-24 PROCEDURE — 99214 OFFICE O/P EST MOD 30 MIN: CPT | Performed by: INTERNAL MEDICINE

## 2024-10-24 PROCEDURE — 1160F RVW MEDS BY RX/DR IN RCRD: CPT | Performed by: INTERNAL MEDICINE

## 2024-10-24 PROCEDURE — G2211 COMPLEX E/M VISIT ADD ON: HCPCS | Performed by: INTERNAL MEDICINE

## 2024-10-24 PROCEDURE — 3077F SYST BP >= 140 MM HG: CPT | Performed by: INTERNAL MEDICINE

## 2024-10-24 PROCEDURE — 1159F MED LIST DOCD IN RCRD: CPT | Performed by: INTERNAL MEDICINE

## 2024-10-24 PROCEDURE — 82962 GLUCOSE BLOOD TEST: CPT | Performed by: INTERNAL MEDICINE

## 2024-10-24 RX ORDER — BLOOD-GLUCOSE METER
1 EACH MISCELLANEOUS 2 TIMES DAILY
Qty: 200 STRIP | Refills: 3 | Status: SHIPPED | OUTPATIENT
Start: 2024-10-24 | End: 2025-10-24

## 2024-10-24 RX ORDER — PIOGLITAZONEHYDROCHLORIDE 15 MG/1
15 TABLET ORAL DAILY
Qty: 90 TABLET | Refills: 1 | Status: SHIPPED | OUTPATIENT
Start: 2024-10-24 | End: 2025-04-22

## 2024-10-24 RX ORDER — GLIPIZIDE 5 MG/1
5 TABLET ORAL 2 TIMES DAILY
Qty: 180 TABLET | Refills: 1 | Status: SHIPPED | OUTPATIENT
Start: 2024-10-24 | End: 2025-04-22

## 2024-10-24 RX ORDER — LANCETS 33 GAUGE
1 EACH MISCELLANEOUS 2 TIMES DAILY
Qty: 200 EACH | Refills: 3 | Status: SHIPPED | OUTPATIENT
Start: 2024-10-24 | End: 2025-10-24

## 2024-10-24 RX ORDER — METFORMIN HYDROCHLORIDE 500 MG/1
500 TABLET ORAL
Qty: 180 TABLET | Refills: 1 | Status: SHIPPED | OUTPATIENT
Start: 2024-10-24 | End: 2025-04-22

## 2024-10-24 ASSESSMENT — ENCOUNTER SYMPTOMS
LIGHT-HEADEDNESS: 0
SHORTNESS OF BREATH: 0
DIZZINESS: 0

## 2024-11-13 PROBLEM — C14.0 THROAT CANCER (MULTI): Status: RESOLVED | Noted: 2023-04-17 | Resolved: 2024-11-13

## 2024-11-15 ENCOUNTER — TELEPHONE (OUTPATIENT)
Dept: PRIMARY CARE | Facility: CLINIC | Age: 84
End: 2024-11-15
Payer: COMMERCIAL

## 2024-11-15 DIAGNOSIS — I10 HYPERTENSION, UNSPECIFIED TYPE: ICD-10-CM

## 2024-11-15 DIAGNOSIS — E78.5 HYPERLIPIDEMIA, UNSPECIFIED HYPERLIPIDEMIA TYPE: ICD-10-CM

## 2024-11-15 RX ORDER — HYDROCHLOROTHIAZIDE 12.5 MG/1
12.5 TABLET ORAL DAILY
Qty: 90 TABLET | Refills: 0 | Status: SHIPPED | OUTPATIENT
Start: 2024-11-15 | End: 2024-11-22 | Stop reason: SDUPTHER

## 2024-11-15 RX ORDER — PRAVASTATIN SODIUM 20 MG/1
20 TABLET ORAL DAILY
Qty: 90 TABLET | Refills: 0 | Status: SHIPPED | OUTPATIENT
Start: 2024-11-15 | End: 2024-11-22 | Stop reason: SDUPTHER

## 2024-11-15 RX ORDER — AMLODIPINE AND BENAZEPRIL HYDROCHLORIDE 10; 20 MG/1; MG/1
1 CAPSULE ORAL DAILY
Qty: 90 CAPSULE | Refills: 0 | Status: SHIPPED | OUTPATIENT
Start: 2024-11-15 | End: 2024-11-22 | Stop reason: SDUPTHER

## 2024-11-15 NOTE — TELEPHONE ENCOUNTER
Pt called to schedule her nov 11/22/24 MCR    LOV 11/2023    She's requesting refills     Amlodipine  Hydrochlorothiazide  Pravastatin    [facility]    Last refills were given at the beginning of the year and pt stated she missed some days on taking her medications but also she had bottles from the original pharmacy she got these from. So she had extra.

## 2024-11-22 ENCOUNTER — APPOINTMENT (OUTPATIENT)
Dept: PRIMARY CARE | Facility: CLINIC | Age: 84
End: 2024-11-22
Payer: MEDICARE

## 2024-11-22 VITALS
SYSTOLIC BLOOD PRESSURE: 140 MMHG | WEIGHT: 170.4 LBS | DIASTOLIC BLOOD PRESSURE: 84 MMHG | BODY MASS INDEX: 31.36 KG/M2 | HEIGHT: 62 IN

## 2024-11-22 DIAGNOSIS — E55.9 VITAMIN D DEFICIENCY: ICD-10-CM

## 2024-11-22 DIAGNOSIS — E78.5 HYPERLIPIDEMIA, UNSPECIFIED HYPERLIPIDEMIA TYPE: ICD-10-CM

## 2024-11-22 DIAGNOSIS — I10 HYPERTENSION, UNSPECIFIED TYPE: ICD-10-CM

## 2024-11-22 DIAGNOSIS — L73.9 FOLLICULITIS: ICD-10-CM

## 2024-11-22 DIAGNOSIS — K21.9 GASTROESOPHAGEAL REFLUX DISEASE WITHOUT ESOPHAGITIS: ICD-10-CM

## 2024-11-22 DIAGNOSIS — Z23 FLU VACCINE NEED: ICD-10-CM

## 2024-11-22 DIAGNOSIS — Z00.00 MEDICARE ANNUAL WELLNESS VISIT, SUBSEQUENT: Primary | ICD-10-CM

## 2024-11-22 DIAGNOSIS — E11.69 TYPE 2 DIABETES MELLITUS WITH OTHER SPECIFIED COMPLICATION, WITHOUT LONG-TERM CURRENT USE OF INSULIN: ICD-10-CM

## 2024-11-22 PROBLEM — S37.009A INJURY OF KIDNEY: Status: ACTIVE | Noted: 2024-11-22

## 2024-11-22 PROBLEM — C14.0 MALIGNANT NEOPLASM OF PHARYNX (MULTI): Status: ACTIVE | Noted: 2018-05-23

## 2024-11-22 PROBLEM — E66.9 OBESITY WITH BODY MASS INDEX 30 OR GREATER: Status: ACTIVE | Noted: 2024-11-22

## 2024-11-22 PROBLEM — E66.9 OBESITY: Status: ACTIVE | Noted: 2024-11-22

## 2024-11-22 PROCEDURE — 3077F SYST BP >= 140 MM HG: CPT | Performed by: NURSE PRACTITIONER

## 2024-11-22 PROCEDURE — 3079F DIAST BP 80-89 MM HG: CPT | Performed by: NURSE PRACTITIONER

## 2024-11-22 PROCEDURE — 1170F FXNL STATUS ASSESSED: CPT | Performed by: NURSE PRACTITIONER

## 2024-11-22 PROCEDURE — 99397 PER PM REEVAL EST PAT 65+ YR: CPT | Performed by: NURSE PRACTITIONER

## 2024-11-22 PROCEDURE — 1159F MED LIST DOCD IN RCRD: CPT | Performed by: NURSE PRACTITIONER

## 2024-11-22 RX ORDER — PRAVASTATIN SODIUM 20 MG/1
20 TABLET ORAL DAILY
Qty: 90 TABLET | Refills: 1 | Status: SHIPPED | OUTPATIENT
Start: 2024-11-22

## 2024-11-22 RX ORDER — OMEPRAZOLE 20 MG/1
20 CAPSULE, DELAYED RELEASE ORAL
Qty: 90 CAPSULE | Refills: 1 | Status: SHIPPED | OUTPATIENT
Start: 2024-11-22

## 2024-11-22 RX ORDER — HYDROCHLOROTHIAZIDE 12.5 MG/1
12.5 TABLET ORAL DAILY
Qty: 90 TABLET | Refills: 1 | Status: SHIPPED | OUTPATIENT
Start: 2024-11-22

## 2024-11-22 RX ORDER — AMLODIPINE AND BENAZEPRIL HYDROCHLORIDE 10; 20 MG/1; MG/1
1 CAPSULE ORAL DAILY
Qty: 90 CAPSULE | Refills: 1 | Status: SHIPPED | OUTPATIENT
Start: 2024-11-22

## 2024-11-22 RX ORDER — CEPHALEXIN 500 MG/1
500 CAPSULE ORAL 2 TIMES DAILY
Qty: 20 CAPSULE | Refills: 0 | Status: SHIPPED | OUTPATIENT
Start: 2024-11-22 | End: 2024-12-02

## 2024-11-22 RX ORDER — MUPIROCIN 20 MG/G
OINTMENT TOPICAL
Qty: 15 G | Refills: 0 | Status: SHIPPED | OUTPATIENT
Start: 2024-11-22 | End: 2024-12-02

## 2024-11-22 ASSESSMENT — ACTIVITIES OF DAILY LIVING (ADL)
DOING_HOUSEWORK: INDEPENDENT
TAKING_MEDICATION: INDEPENDENT
DRESSING: INDEPENDENT
DOING_HOUSEWORK: INDEPENDENT
MANAGING_FINANCES: INDEPENDENT
TAKING_MEDICATION: INDEPENDENT
GROCERY_SHOPPING: INDEPENDENT
BATHING: INDEPENDENT
GROCERY_SHOPPING: INDEPENDENT
MANAGING_FINANCES: INDEPENDENT

## 2024-11-22 ASSESSMENT — ENCOUNTER SYMPTOMS
CARDIOVASCULAR NEGATIVE: 1
RESPIRATORY NEGATIVE: 1
NEUROLOGICAL NEGATIVE: 1
GASTROINTESTINAL NEGATIVE: 1
CONSTITUTIONAL NEGATIVE: 1
HEMATOLOGIC/LYMPHATIC NEGATIVE: 1
SLEEP DISTURBANCE: 0

## 2024-11-22 ASSESSMENT — PATIENT HEALTH QUESTIONNAIRE - PHQ9
1. LITTLE INTEREST OR PLEASURE IN DOING THINGS: NOT AT ALL
SUM OF ALL RESPONSES TO PHQ9 QUESTIONS 1 AND 2: 0
2. FEELING DOWN, DEPRESSED OR HOPELESS: NOT AT ALL

## 2024-11-22 NOTE — PATIENT INSTRUCTIONS
Sent antibiotic and topical antibiotic to apply right side lower abdomen, keep area clean and dry, avoid scratching area.  If no improvement contact office for further advice.     Right ear has some inflammation, please keep me informed if pain starts, current antibiotic may help with this infection     Please catch up with eye exam and dentist.     Please complete labs this month.  Labs are fasting 10-12 hours do not eat, please drink adequate water prior to lab draw.

## 2024-11-22 NOTE — PROGRESS NOTES
"Subjective   Patient ID: Марина Lozano is a 84 y.o. female who presents for Medicare Annual Wellness Visit Subsequent (MCR/LOV 12/6/23/Labs not done per pt. ).    HPI   Patient here for medicare Wellness. Last office visit on 12/06/2024  Lives in [facility].   Blood sugar checked at home once daily - she does not remember what running   Current concern:   Chronic concerns: DMT2, Hyperlipidemia, Hypertension, GERD, Vitamin D deficiency, Decreased kidney function,  Specialist  - Endocrinology Dr. Hsu,   - ENT  Labs 06/13/2023     Diet- reports healthy meals, 2-3 meals a day  Hydration - water, coffee, tea, unsweetened Cran Apple juice  Exercise- walking daily in parking lot. 2-3 x week 1/2 hour currently.     Review of Systems   Constitutional: Negative.    HENT:  Positive for congestion (slightl congestion especially in am.). Negative for ear pain.         DDS- last exam over a year   Eyes:         Eyelids slightly red swollen.   Eye exam within the last year- denies concerns     Respiratory: Negative.     Cardiovascular: Negative.    Gastrointestinal: Negative.    Genitourinary: Negative.    Skin:         Right groin lesions- scratching at it at night. Comes and goes   Allergic/Immunologic: Positive for environmental allergies.   Neurological: Negative.    Hematological: Negative.    Psychiatric/Behavioral:  Negative for sleep disturbance.      Objective   /84 (BP Location: Left arm, Patient Position: Sitting, BP Cuff Size: Adult)   Ht 1.575 m (5' 2\")   Wt 77.3 kg (170 lb 6.4 oz)   BMI 31.17 kg/m²   Weight in December 2023 191 lbs   Watching portion sizes and walking, trying to lose weight.   Physical Exam  Vitals reviewed.   Constitutional:       Comments: Disheveled appearance    HENT:      Right Ear: Tympanic membrane and ear canal normal.      Left Ear: Ear canal normal. A middle ear effusion is present.      Nose: Nose normal.      Mouth/Throat:      Lips: Pink.      Mouth: Mucous " membranes are moist.      Dentition: Abnormal dentition. Dental caries present.      Pharynx: Oropharynx is clear.   Eyes:      General: Allergic shiner (bilateral medial upper & lower lid enflamed as noted on diagram) present.      Conjunctiva/sclera: Conjunctivae normal.     Neck:      Thyroid: No thyromegaly.      Vascular: No carotid bruit.   Cardiovascular:      Rate and Rhythm: Normal rate and regular rhythm.      Heart sounds: Normal heart sounds.   Pulmonary:      Effort: Pulmonary effort is normal.      Breath sounds: Normal breath sounds. No decreased breath sounds, wheezing, rhonchi or rales.   Abdominal:      General: Bowel sounds are normal.      Palpations: Abdomen is soft.      Tenderness: There is no abdominal tenderness.   Musculoskeletal:      Cervical back: Neck supple.   Lymphadenopathy:      Cervical: No cervical adenopathy.   Skin:     Findings: Rash present. Rash is macular and papular.             Comments: Area as noted on diagram has multiple (7-9 individual lesions) 5- 10 mm erythremic lesions- appearance of bed bug bites surrounding skin unremarkable.    Neurological:      General: No focal deficit present.      Mental Status: She is alert.      Gait: Gait is intact.   Psychiatric:         Attention and Perception: Attention normal.         Behavior: Behavior normal. Behavior is cooperative.       Assessment/Plan   Labs -  06/13/2023   Influenza- received today  11/22/2024  Prevnar 13/20- Pneumovax 03/24/2022  Shingrix- received Zostavax  Colonoscopy- declines further screening   Mammogram- declines further screening   DEXA BONE Density declines further screening     Diagnoses and all orders for this visit:  Medicare annual wellness visit, subsequent  reviewed screenings, immunizations and vital signs. Reviewed appropriate health screenings/practices: including regular DDS & eye exams, wearing sunscreen,    appropriate balanced diet, such as the Mediterranean diet or low fat heart healthy  diet,  exercise - moderate activity of at least 150 minutes a week, obtain and maintain normal Body Mass Index.        Hypertension, unspecified type  Acceptable BP    - refilled hydroCHLOROthiazide (Microzide) 12.5 mg tablet; Take 1 tablet (12.5 mg) by mouth once daily.  - refilled  amLODIPine-benazepriL (LotreL) 10-20 mg capsule; Take 1 capsule by mouth once daily.  Type 2 diabetes mellitus with other specified complication, without long-term current use of insulin- medications managed by endocrinology   Hyperlipidemia, unspecified hyperlipidemia type  Advised patient to complete labs  - refilled  pravastatin (Pravachol) 20 mg tablet; Take 1 tablet (20 mg) by mouth once daily.  Gastroesophageal reflux disease without esophagitis- well controlled  - Refilled omeprazole (PriLOSEC) 20 mg DR capsule; Take 1 capsule (20 mg) by mouth once daily in the morning. Take before meals. DO NOT CUSH OR CHEW  Vitamin D deficiency-  taking over the counter vitamin D supplement  Folliculitis   Discussed with patient that area on lower abdomen has appearance of bed bug bites- she denies any such problem- she checks her bed.   -     cephalexin (Keflex) 500 mg capsule; Take 1 capsule (500 mg) by mouth 2 times a day for 10 days.  -     mupirocin (Bactroban) 2 % ointment; Apply topically 3 times a day for 10 days.  Flu vaccine need  -     Flu vaccine, trivalent, preservative free, age 6 months and greater (Fluarix/Fluzone/Flulaval)    PLAN: Follow up 6 months  labs

## 2024-11-25 PROCEDURE — G0008 ADMIN INFLUENZA VIRUS VAC: HCPCS | Performed by: NURSE PRACTITIONER

## 2024-11-25 PROCEDURE — 90656 IIV3 VACC NO PRSV 0.5 ML IM: CPT | Performed by: NURSE PRACTITIONER

## 2025-01-01 ENCOUNTER — APPOINTMENT (OUTPATIENT)
Dept: RADIOLOGY | Facility: HOSPITAL | Age: 85
DRG: 208 | End: 2025-01-01
Payer: MEDICARE

## 2025-01-01 ENCOUNTER — APPOINTMENT (OUTPATIENT)
Dept: CARDIOLOGY | Facility: HOSPITAL | Age: 85
DRG: 208 | End: 2025-01-01
Payer: MEDICARE

## 2025-01-01 PROCEDURE — 71045 X-RAY EXAM CHEST 1 VIEW: CPT

## 2025-01-01 PROCEDURE — 74176 CT ABD & PELVIS W/O CONTRAST: CPT

## 2025-01-01 PROCEDURE — 93005 ELECTROCARDIOGRAM TRACING: CPT

## 2025-01-01 PROCEDURE — 70450 CT HEAD/BRAIN W/O DYE: CPT

## 2025-01-01 PROCEDURE — 71046 X-RAY EXAM CHEST 2 VIEWS: CPT

## 2025-01-01 PROCEDURE — 74018 RADEX ABDOMEN 1 VIEW: CPT

## 2025-02-15 ENCOUNTER — HOSPITAL ENCOUNTER (INPATIENT)
Facility: HOSPITAL | Age: 85
DRG: 208 | End: 2025-02-15
Attending: STUDENT IN AN ORGANIZED HEALTH CARE EDUCATION/TRAINING PROGRAM | Admitting: STUDENT IN AN ORGANIZED HEALTH CARE EDUCATION/TRAINING PROGRAM
Payer: MEDICARE

## 2025-02-15 DIAGNOSIS — U07.1 COVID: ICD-10-CM

## 2025-02-15 DIAGNOSIS — E11.10 DKA, TYPE 2, NOT AT GOAL: Primary | ICD-10-CM

## 2025-02-15 DIAGNOSIS — I95.89 HYPOTENSION DUE TO HYPOVOLEMIA: ICD-10-CM

## 2025-02-15 DIAGNOSIS — I95.9 HYPOTENSION, UNSPECIFIED HYPOTENSION TYPE: ICD-10-CM

## 2025-02-15 DIAGNOSIS — R79.89 ELEVATED TROPONIN: ICD-10-CM

## 2025-02-15 DIAGNOSIS — J96.01 ACUTE HYPOXIC RESPIRATORY FAILURE (MULTI): ICD-10-CM

## 2025-02-15 DIAGNOSIS — E86.1 HYPOTENSION DUE TO HYPOVOLEMIA: ICD-10-CM

## 2025-02-15 DIAGNOSIS — K85.90 ACUTE PANCREATITIS, UNSPECIFIED COMPLICATION STATUS, UNSPECIFIED PANCREATITIS TYPE (HHS-HCC): ICD-10-CM

## 2025-02-15 PROBLEM — E11.01: Status: ACTIVE | Noted: 2025-01-01

## 2025-02-15 PROBLEM — N18.9 ACUTE KIDNEY INJURY SUPERIMPOSED ON CKD (CMS-HCC): Status: ACTIVE | Noted: 2023-04-17

## 2025-02-15 LAB
ALBUMIN SERPL BCP-MCNC: 4 G/DL (ref 3.4–5)
ALP SERPL-CCNC: 140 U/L (ref 33–136)
ALT SERPL W P-5'-P-CCNC: 48 U/L (ref 7–45)
ANION GAP BLDV CALCULATED.4IONS-SCNC: 24 MMOL/L (ref 10–25)
ANION GAP SERPL CALC-SCNC: 27 MMOL/L (ref 10–20)
ANION GAP SERPL CALC-SCNC: 32 MMOL/L (ref 10–20)
AST SERPL W P-5'-P-CCNC: 36 U/L (ref 9–39)
B-OH-BUTYR SERPL-SCNC: 6.02 MMOL/L (ref 0.02–0.27)
BASE EXCESS BLDV CALC-SCNC: -4.2 MMOL/L (ref -2–3)
BASOPHILS # BLD AUTO: 0.02 X10*3/UL (ref 0–0.1)
BASOPHILS NFR BLD AUTO: 0.2 %
BILIRUB SERPL-MCNC: 0.8 MG/DL (ref 0–1.2)
BNP SERPL-MCNC: 127 PG/ML (ref 0–99)
BODY TEMPERATURE: 37 DEGREES CELSIUS
BUN SERPL-MCNC: 53 MG/DL (ref 6–23)
BUN SERPL-MCNC: 54 MG/DL (ref 6–23)
CA-I BLDV-SCNC: 1.23 MMOL/L (ref 1.1–1.33)
CALCIUM SERPL-MCNC: 10.1 MG/DL (ref 8.6–10.3)
CALCIUM SERPL-MCNC: 9.2 MG/DL (ref 8.6–10.3)
CARDIAC TROPONIN I PNL SERPL HS: 78 NG/L (ref 0–13)
CARDIAC TROPONIN I PNL SERPL HS: 87 NG/L (ref 0–13)
CHLORIDE BLDV-SCNC: 88 MMOL/L (ref 98–107)
CHLORIDE SERPL-SCNC: 85 MMOL/L (ref 98–107)
CHLORIDE SERPL-SCNC: 92 MMOL/L (ref 98–107)
CHOLEST SERPL-MCNC: 199 MG/DL (ref 0–199)
CHOLESTEROL/HDL RATIO: 4.4
CO2 SERPL-SCNC: 20 MMOL/L (ref 21–32)
CO2 SERPL-SCNC: 21 MMOL/L (ref 21–32)
CREAT SERPL-MCNC: 2.97 MG/DL (ref 0.5–1.05)
CREAT SERPL-MCNC: 2.98 MG/DL (ref 0.5–1.05)
EGFRCR SERPLBLD CKD-EPI 2021: 15 ML/MIN/1.73M*2
EGFRCR SERPLBLD CKD-EPI 2021: 15 ML/MIN/1.73M*2
EOSINOPHIL # BLD AUTO: 0 X10*3/UL (ref 0–0.4)
EOSINOPHIL NFR BLD AUTO: 0 %
ERYTHROCYTE [DISTWIDTH] IN BLOOD BY AUTOMATED COUNT: 15.4 % (ref 11.5–14.5)
FLUAV RNA RESP QL NAA+PROBE: NOT DETECTED
FLUBV RNA RESP QL NAA+PROBE: NOT DETECTED
GLUCOSE BLD MANUAL STRIP-MCNC: >600 MG/DL (ref 74–99)
GLUCOSE BLDV-MCNC: >685 MG/DL (ref 74–99)
GLUCOSE SERPL-MCNC: 1035 MG/DL (ref 74–99)
GLUCOSE SERPL-MCNC: 1248 MG/DL (ref 74–99)
HCO3 BLDV-SCNC: 23.8 MMOL/L (ref 22–26)
HCT VFR BLD AUTO: 54 % (ref 36–46)
HCT VFR BLD EST: 53 % (ref 36–46)
HDLC SERPL-MCNC: 45.4 MG/DL
HGB BLD-MCNC: 16.9 G/DL (ref 12–16)
HGB BLDV-MCNC: 17.6 G/DL (ref 12–16)
IMM GRANULOCYTES # BLD AUTO: 0.04 X10*3/UL (ref 0–0.5)
IMM GRANULOCYTES NFR BLD AUTO: 0.3 % (ref 0–0.9)
INHALED O2 CONCENTRATION: 21 %
LACTATE BLDV-SCNC: 8.9 MMOL/L (ref 0.4–2)
LIPASE SERPL-CCNC: 4361 U/L (ref 9–82)
LYMPHOCYTES # BLD AUTO: 0.63 X10*3/UL (ref 0.8–3)
LYMPHOCYTES NFR BLD AUTO: 4.9 %
MAGNESIUM SERPL-MCNC: 3.29 MG/DL (ref 1.6–2.4)
MCH RBC QN AUTO: 26.2 PG (ref 26–34)
MCHC RBC AUTO-ENTMCNC: 31.3 G/DL (ref 32–36)
MCV RBC AUTO: 84 FL (ref 80–100)
MONOCYTES # BLD AUTO: 0.5 X10*3/UL (ref 0.05–0.8)
MONOCYTES NFR BLD AUTO: 3.9 %
NEUTROPHILS # BLD AUTO: 11.65 X10*3/UL (ref 1.6–5.5)
NEUTROPHILS NFR BLD AUTO: 90.7 %
NON-HDL CHOLESTEROL: 154 MG/DL (ref 0–149)
NRBC BLD-RTO: 0 /100 WBCS (ref 0–0)
OXYHGB MFR BLDV: 51.6 % (ref 45–75)
PCO2 BLDV: 53 MM HG (ref 41–51)
PH BLDV: 7.26 PH (ref 7.33–7.43)
PLATELET # BLD AUTO: 234 X10*3/UL (ref 150–450)
PO2 BLDV: 36 MM HG (ref 35–45)
POTASSIUM BLDV-SCNC: 3.7 MMOL/L (ref 3.5–5.3)
POTASSIUM SERPL-SCNC: 3 MMOL/L (ref 3.5–5.3)
POTASSIUM SERPL-SCNC: 3.4 MMOL/L (ref 3.5–5.3)
PROT SERPL-MCNC: 8.4 G/DL (ref 6.4–8.2)
RBC # BLD AUTO: 6.46 X10*6/UL (ref 4–5.2)
SAO2 % BLDV: 52 % (ref 45–75)
SARS-COV-2 RNA RESP QL NAA+PROBE: DETECTED
SODIUM BLDV-SCNC: 132 MMOL/L (ref 136–145)
SODIUM SERPL-SCNC: 134 MMOL/L (ref 136–145)
SODIUM SERPL-SCNC: 137 MMOL/L (ref 136–145)
TRIGL SERPL-MCNC: 372 MG/DL (ref 0–149)
WBC # BLD AUTO: 12.8 X10*3/UL (ref 4.4–11.3)

## 2025-02-15 PROCEDURE — 96365 THER/PROPH/DIAG IV INF INIT: CPT | Mod: 59

## 2025-02-15 PROCEDURE — 74176 CT ABD & PELVIS W/O CONTRAST: CPT | Performed by: RADIOLOGY

## 2025-02-15 PROCEDURE — 83880 ASSAY OF NATRIURETIC PEPTIDE: CPT | Performed by: STUDENT IN AN ORGANIZED HEALTH CARE EDUCATION/TRAINING PROGRAM

## 2025-02-15 PROCEDURE — 84132 ASSAY OF SERUM POTASSIUM: CPT | Performed by: STUDENT IN AN ORGANIZED HEALTH CARE EDUCATION/TRAINING PROGRAM

## 2025-02-15 PROCEDURE — 99291 CRITICAL CARE FIRST HOUR: CPT | Mod: 25 | Performed by: STUDENT IN AN ORGANIZED HEALTH CARE EDUCATION/TRAINING PROGRAM

## 2025-02-15 PROCEDURE — 99291 CRITICAL CARE FIRST HOUR: CPT | Performed by: STUDENT IN AN ORGANIZED HEALTH CARE EDUCATION/TRAINING PROGRAM

## 2025-02-15 PROCEDURE — 96361 HYDRATE IV INFUSION ADD-ON: CPT

## 2025-02-15 PROCEDURE — 83690 ASSAY OF LIPASE: CPT | Performed by: STUDENT IN AN ORGANIZED HEALTH CARE EDUCATION/TRAINING PROGRAM

## 2025-02-15 PROCEDURE — 36415 COLL VENOUS BLD VENIPUNCTURE: CPT | Performed by: STUDENT IN AN ORGANIZED HEALTH CARE EDUCATION/TRAINING PROGRAM

## 2025-02-15 PROCEDURE — 85025 COMPLETE CBC W/AUTO DIFF WBC: CPT | Performed by: STUDENT IN AN ORGANIZED HEALTH CARE EDUCATION/TRAINING PROGRAM

## 2025-02-15 PROCEDURE — 84100 ASSAY OF PHOSPHORUS: CPT

## 2025-02-15 PROCEDURE — 96368 THER/DIAG CONCURRENT INF: CPT

## 2025-02-15 PROCEDURE — 80061 LIPID PANEL: CPT | Performed by: STUDENT IN AN ORGANIZED HEALTH CARE EDUCATION/TRAINING PROGRAM

## 2025-02-15 PROCEDURE — 96367 TX/PROPH/DG ADDL SEQ IV INF: CPT

## 2025-02-15 PROCEDURE — 96372 THER/PROPH/DIAG INJ SC/IM: CPT | Performed by: STUDENT IN AN ORGANIZED HEALTH CARE EDUCATION/TRAINING PROGRAM

## 2025-02-15 PROCEDURE — 82010 KETONE BODYS QUAN: CPT | Performed by: STUDENT IN AN ORGANIZED HEALTH CARE EDUCATION/TRAINING PROGRAM

## 2025-02-15 PROCEDURE — 84132 ASSAY OF SERUM POTASSIUM: CPT

## 2025-02-15 PROCEDURE — 2500000004 HC RX 250 GENERAL PHARMACY W/ HCPCS (ALT 636 FOR OP/ED): Performed by: STUDENT IN AN ORGANIZED HEALTH CARE EDUCATION/TRAINING PROGRAM

## 2025-02-15 PROCEDURE — 87636 SARSCOV2 & INF A&B AMP PRB: CPT | Performed by: STUDENT IN AN ORGANIZED HEALTH CARE EDUCATION/TRAINING PROGRAM

## 2025-02-15 PROCEDURE — 70450 CT HEAD/BRAIN W/O DYE: CPT | Performed by: RADIOLOGY

## 2025-02-15 PROCEDURE — 84484 ASSAY OF TROPONIN QUANT: CPT | Performed by: STUDENT IN AN ORGANIZED HEALTH CARE EDUCATION/TRAINING PROGRAM

## 2025-02-15 PROCEDURE — 2500000004 HC RX 250 GENERAL PHARMACY W/ HCPCS (ALT 636 FOR OP/ED)

## 2025-02-15 PROCEDURE — 83735 ASSAY OF MAGNESIUM: CPT | Performed by: STUDENT IN AN ORGANIZED HEALTH CARE EDUCATION/TRAINING PROGRAM

## 2025-02-15 PROCEDURE — 82947 ASSAY GLUCOSE BLOOD QUANT: CPT

## 2025-02-15 PROCEDURE — 83930 ASSAY OF BLOOD OSMOLALITY: CPT | Mod: PORLAB

## 2025-02-15 PROCEDURE — 96366 THER/PROPH/DIAG IV INF ADDON: CPT

## 2025-02-15 PROCEDURE — 99291 CRITICAL CARE FIRST HOUR: CPT

## 2025-02-15 PROCEDURE — 71046 X-RAY EXAM CHEST 2 VIEWS: CPT | Performed by: RADIOLOGY

## 2025-02-15 PROCEDURE — 83735 ASSAY OF MAGNESIUM: CPT

## 2025-02-15 RX ORDER — DEXTROSE MONOHYDRATE 100 MG/ML
150 INJECTION, SOLUTION INTRAVENOUS CONTINUOUS PRN
Status: DISCONTINUED | OUTPATIENT
Start: 2025-02-15 | End: 2025-02-16

## 2025-02-15 RX ORDER — PANTOPRAZOLE SODIUM 40 MG/10ML
40 INJECTION, POWDER, LYOPHILIZED, FOR SOLUTION INTRAVENOUS
Status: DISCONTINUED | OUTPATIENT
Start: 2025-02-16 | End: 2025-02-16

## 2025-02-15 RX ORDER — DEXTROSE MONOHYDRATE AND SODIUM CHLORIDE 5; .9 G/100ML; G/100ML
150 INJECTION, SOLUTION INTRAVENOUS CONTINUOUS PRN
Status: DISCONTINUED | OUTPATIENT
Start: 2025-02-15 | End: 2025-02-16

## 2025-02-15 RX ORDER — SODIUM CHLORIDE 450 MG/100ML
250 INJECTION, SOLUTION INTRAVENOUS CONTINUOUS
Status: DISCONTINUED | OUTPATIENT
Start: 2025-02-15 | End: 2025-02-16

## 2025-02-15 RX ORDER — ONDANSETRON 4 MG/1
4 TABLET, FILM COATED ORAL EVERY 8 HOURS PRN
Status: DISCONTINUED | OUTPATIENT
Start: 2025-02-15 | End: 2025-02-17 | Stop reason: HOSPADM

## 2025-02-15 RX ORDER — POTASSIUM CHLORIDE 14.9 MG/ML
20 INJECTION INTRAVENOUS ONCE
Status: COMPLETED | OUTPATIENT
Start: 2025-02-15 | End: 2025-02-15

## 2025-02-15 RX ORDER — TALC
3 POWDER (GRAM) TOPICAL NIGHTLY PRN
Status: DISCONTINUED | OUTPATIENT
Start: 2025-02-15 | End: 2025-02-17

## 2025-02-15 RX ORDER — DEXTROSE 50 % IN WATER (D50W) INTRAVENOUS SYRINGE
50 AS NEEDED
Status: DISCONTINUED | OUTPATIENT
Start: 2025-02-15 | End: 2025-02-16

## 2025-02-15 RX ORDER — SODIUM CHLORIDE 450 MG/100ML
250 INJECTION, SOLUTION INTRAVENOUS CONTINUOUS
Status: DISCONTINUED | OUTPATIENT
Start: 2025-02-15 | End: 2025-02-15

## 2025-02-15 RX ORDER — HEPARIN SODIUM 5000 [USP'U]/ML
5000 INJECTION, SOLUTION INTRAVENOUS; SUBCUTANEOUS EVERY 8 HOURS SCHEDULED
Status: DISCONTINUED | OUTPATIENT
Start: 2025-02-15 | End: 2025-02-16

## 2025-02-15 RX ORDER — PANTOPRAZOLE SODIUM 40 MG/1
40 TABLET, DELAYED RELEASE ORAL
Status: DISCONTINUED | OUTPATIENT
Start: 2025-02-16 | End: 2025-02-16

## 2025-02-15 RX ORDER — BISACODYL 5 MG
10 TABLET, DELAYED RELEASE (ENTERIC COATED) ORAL DAILY PRN
Status: DISCONTINUED | OUTPATIENT
Start: 2025-02-15 | End: 2025-02-17

## 2025-02-15 RX ORDER — ONDANSETRON HYDROCHLORIDE 2 MG/ML
4 INJECTION, SOLUTION INTRAVENOUS EVERY 8 HOURS PRN
Status: DISCONTINUED | OUTPATIENT
Start: 2025-02-15 | End: 2025-02-17 | Stop reason: HOSPADM

## 2025-02-15 RX ORDER — PRAVASTATIN SODIUM 20 MG/1
20 TABLET ORAL DAILY
Status: DISCONTINUED | OUTPATIENT
Start: 2025-02-16 | End: 2025-02-17 | Stop reason: HOSPADM

## 2025-02-15 RX ORDER — GUAIFENESIN 600 MG/1
600 TABLET, EXTENDED RELEASE ORAL EVERY 12 HOURS PRN
Status: DISCONTINUED | OUTPATIENT
Start: 2025-02-15 | End: 2025-02-17

## 2025-02-15 RX ORDER — POTASSIUM CHLORIDE 14.9 MG/ML
20 INJECTION INTRAVENOUS
Status: COMPLETED | OUTPATIENT
Start: 2025-02-15 | End: 2025-02-16

## 2025-02-15 RX ADMIN — SODIUM CHLORIDE, POTASSIUM CHLORIDE, SODIUM LACTATE AND CALCIUM CHLORIDE 1000 ML: 600; 310; 30; 20 INJECTION, SOLUTION INTRAVENOUS at 19:39

## 2025-02-15 RX ADMIN — SODIUM CHLORIDE 250 ML/HR: 4.5 INJECTION, SOLUTION INTRAVENOUS at 23:18

## 2025-02-15 RX ADMIN — HEPARIN SODIUM 5000 UNITS: 5000 INJECTION, SOLUTION INTRAVENOUS; SUBCUTANEOUS at 23:45

## 2025-02-15 RX ADMIN — SODIUM CHLORIDE, POTASSIUM CHLORIDE, SODIUM LACTATE AND CALCIUM CHLORIDE 1000 ML: 600; 310; 30; 20 INJECTION, SOLUTION INTRAVENOUS at 20:09

## 2025-02-15 RX ADMIN — SODIUM CHLORIDE 250 ML/HR: 4.5 INJECTION, SOLUTION INTRAVENOUS at 22:25

## 2025-02-15 RX ADMIN — INSULIN HUMAN 3.6 UNITS/HR: 1 INJECTION, SOLUTION INTRAVENOUS at 23:19

## 2025-02-15 RX ADMIN — POTASSIUM CHLORIDE 20 MEQ: 200 INJECTION, SOLUTION INTRAVENOUS at 21:02

## 2025-02-15 RX ADMIN — INSULIN HUMAN 3.6 UNITS/HR: 1 INJECTION, SOLUTION INTRAVENOUS at 21:58

## 2025-02-15 RX ADMIN — POTASSIUM CHLORIDE 20 MEQ: 200 INJECTION, SOLUTION INTRAVENOUS at 23:59

## 2025-02-15 ASSESSMENT — ENCOUNTER SYMPTOMS
LIGHT-HEADEDNESS: 0
DYSURIA: 0
JOINT SWELLING: 0
STRIDOR: 0
COUGH: 1
ABDOMINAL PAIN: 0
BACK PAIN: 0
WEAKNESS: 0
WOUND: 0
VOMITING: 0
AGITATION: 0
WHEEZING: 0
FLANK PAIN: 0
DIARRHEA: 0
FEVER: 0
NERVOUS/ANXIOUS: 0
ARTHRALGIAS: 0
FATIGUE: 1
SINUS PAIN: 0
DIAPHORESIS: 0
ABDOMINAL DISTENTION: 0
CHILLS: 0
DIFFICULTY URINATING: 0
CONSTIPATION: 0
ACTIVITY CHANGE: 1
MYALGIAS: 0
APNEA: 0
COLOR CHANGE: 0
CHEST TIGHTNESS: 0
APPETITE CHANGE: 1
CONFUSION: 0
RHINORRHEA: 0
SORE THROAT: 0
NUMBNESS: 0
PALPITATIONS: 0
DIZZINESS: 0
FREQUENCY: 0
HEMATURIA: 0
SHORTNESS OF BREATH: 1
HEADACHES: 0
DECREASED CONCENTRATION: 0
NAUSEA: 0

## 2025-02-15 ASSESSMENT — LIFESTYLE VARIABLES
EVER HAD A DRINK FIRST THING IN THE MORNING TO STEADY YOUR NERVES TO GET RID OF A HANGOVER: NO
HAVE YOU EVER FELT YOU SHOULD CUT DOWN ON YOUR DRINKING: NO
EVER FELT BAD OR GUILTY ABOUT YOUR DRINKING: NO
TOTAL SCORE: 0
HAVE PEOPLE ANNOYED YOU BY CRITICIZING YOUR DRINKING: NO

## 2025-02-15 ASSESSMENT — PAIN DESCRIPTION - PROGRESSION: CLINICAL_PROGRESSION: NOT CHANGED

## 2025-02-15 ASSESSMENT — COLUMBIA-SUICIDE SEVERITY RATING SCALE - C-SSRS
2. HAVE YOU ACTUALLY HAD ANY THOUGHTS OF KILLING YOURSELF?: NO
6. HAVE YOU EVER DONE ANYTHING, STARTED TO DO ANYTHING, OR PREPARED TO DO ANYTHING TO END YOUR LIFE?: NO
1. IN THE PAST MONTH, HAVE YOU WISHED YOU WERE DEAD OR WISHED YOU COULD GO TO SLEEP AND NOT WAKE UP?: NO

## 2025-02-15 ASSESSMENT — PAIN - FUNCTIONAL ASSESSMENT: PAIN_FUNCTIONAL_ASSESSMENT: 0-10

## 2025-02-15 ASSESSMENT — PAIN SCALES - GENERAL: PAINLEVEL_OUTOF10: 0 - NO PAIN

## 2025-02-16 LAB
ABO GROUP (TYPE) IN BLOOD: NORMAL
ABO GROUP (TYPE) IN BLOOD: NORMAL
ALBUMIN SERPL BCP-MCNC: 3 G/DL (ref 3.4–5)
ALBUMIN SERPL BCP-MCNC: 3.1 G/DL (ref 3.4–5)
ALP SERPL-CCNC: 113 U/L (ref 33–136)
ALP SERPL-CCNC: 74 U/L (ref 33–136)
ALT SERPL W P-5'-P-CCNC: 281 U/L (ref 7–45)
ALT SERPL W P-5'-P-CCNC: 43 U/L (ref 7–45)
AMORPH CRY #/AREA UR COMP ASSIST: ABNORMAL /HPF
ANION GAP BLDA CALCULATED.4IONS-SCNC: 17 MMO/L (ref 10–25)
ANION GAP BLDA CALCULATED.4IONS-SCNC: 20 MMO/L (ref 10–25)
ANION GAP BLDA CALCULATED.4IONS-SCNC: 23 MMO/L (ref 10–25)
ANION GAP SERPL CALC-SCNC: 20 MMOL/L (ref 10–20)
ANION GAP SERPL CALC-SCNC: 22 MMOL/L (ref 10–20)
ANION GAP SERPL CALC-SCNC: 24 MMOL/L (ref 10–20)
ANION GAP SERPL CALC-SCNC: 27 MMOL/L (ref 10–20)
ANTIBODY SCREEN: NORMAL
APPEARANCE UR: ABNORMAL
AST SERPL W P-5'-P-CCNC: 54 U/L (ref 9–39)
AST SERPL W P-5'-P-CCNC: 869 U/L (ref 9–39)
BACTERIA #/AREA URNS AUTO: ABNORMAL /HPF
BACTERIA BLD CULT: NORMAL
BACTERIA BLD CULT: NORMAL
BASE EXCESS BLDA CALC-SCNC: -10.9 MMOL/L (ref -2–3)
BASE EXCESS BLDA CALC-SCNC: -12.4 MMOL/L (ref -2–3)
BASE EXCESS BLDA CALC-SCNC: -17.5 MMOL/L (ref -2–3)
BASOPHILS # BLD AUTO: 0.04 X10*3/UL (ref 0–0.1)
BASOPHILS NFR BLD AUTO: 0.3 %
BILIRUB DIRECT SERPL-MCNC: 0.2 MG/DL (ref 0–0.3)
BILIRUB SERPL-MCNC: 0.6 MG/DL (ref 0–1.2)
BILIRUB SERPL-MCNC: 0.8 MG/DL (ref 0–1.2)
BILIRUB UR STRIP.AUTO-MCNC: NEGATIVE MG/DL
BLOOD EXPIRATION DATE: NORMAL
BODY TEMPERATURE: 37 DEGREES CELSIUS
BODY TEMPERATURE: 37 DEGREES CELSIUS
BODY TEMPERATURE: ABNORMAL
BUN SERPL-MCNC: 51 MG/DL (ref 6–23)
BUN SERPL-MCNC: 51 MG/DL (ref 6–23)
BUN SERPL-MCNC: 55 MG/DL (ref 6–23)
BUN SERPL-MCNC: 55 MG/DL (ref 6–23)
BUN SERPL-MCNC: 56 MG/DL (ref 6–23)
CA-I BLDA-SCNC: 1.1 MMOL/L (ref 1.1–1.33)
CA-I BLDA-SCNC: 1.12 MMOL/L (ref 1.1–1.33)
CA-I BLDA-SCNC: 1.16 MMOL/L (ref 1.1–1.33)
CALCIUM SERPL-MCNC: 7.3 MG/DL (ref 8.6–10.3)
CALCIUM SERPL-MCNC: 7.3 MG/DL (ref 8.6–10.3)
CALCIUM SERPL-MCNC: 7.9 MG/DL (ref 8.6–10.3)
CALCIUM SERPL-MCNC: 8.5 MG/DL (ref 8.6–10.3)
CALCIUM SERPL-MCNC: 8.6 MG/DL (ref 8.6–10.3)
CALCIUM SERPL-MCNC: 8.7 MG/DL (ref 8.6–10.3)
CALCIUM SERPL-MCNC: 8.8 MG/DL (ref 8.6–10.3)
CALCIUM SERPL-MCNC: 8.9 MG/DL (ref 8.6–10.3)
CARDIAC TROPONIN I PNL SERPL HS: 102 NG/L (ref 0–13)
CARDIAC TROPONIN I PNL SERPL HS: 133 NG/L (ref 0–13)
CARDIAC TROPONIN I PNL SERPL HS: 187 NG/L (ref 0–13)
CARDIAC TROPONIN I PNL SERPL HS: 299 NG/L (ref 0–13)
CARDIAC TROPONIN I PNL SERPL HS: 89 NG/L (ref 0–13)
CHLORIDE BLDA-SCNC: 102 MMOL/L (ref 98–107)
CHLORIDE BLDA-SCNC: 104 MMOL/L (ref 98–107)
CHLORIDE BLDA-SCNC: 106 MMOL/L (ref 98–107)
CHLORIDE SERPL-SCNC: 106 MMOL/L (ref 98–107)
CHLORIDE SERPL-SCNC: 109 MMOL/L (ref 98–107)
CHLORIDE SERPL-SCNC: 109 MMOL/L (ref 98–107)
CHLORIDE SERPL-SCNC: 94 MMOL/L (ref 98–107)
CHLORIDE SERPL-SCNC: 94 MMOL/L (ref 98–107)
CHLORIDE SERPL-SCNC: 96 MMOL/L (ref 98–107)
CHLORIDE SERPL-SCNC: 96 MMOL/L (ref 98–107)
CHLORIDE SERPL-SCNC: 99 MMOL/L (ref 98–107)
CK SERPL-CCNC: 91 U/L (ref 0–215)
CO2 SERPL-SCNC: 14 MMOL/L (ref 21–32)
CO2 SERPL-SCNC: 14 MMOL/L (ref 21–32)
CO2 SERPL-SCNC: 16 MMOL/L (ref 21–32)
CO2 SERPL-SCNC: 17 MMOL/L (ref 21–32)
CO2 SERPL-SCNC: 18 MMOL/L (ref 21–32)
CO2 SERPL-SCNC: 20 MMOL/L (ref 21–32)
CO2 SERPL-SCNC: 22 MMOL/L (ref 21–32)
CO2 SERPL-SCNC: 22 MMOL/L (ref 21–32)
COLOR UR: ABNORMAL
CREAT SERPL-MCNC: 3.03 MG/DL (ref 0.5–1.05)
CREAT SERPL-MCNC: 3.23 MG/DL (ref 0.5–1.05)
CREAT SERPL-MCNC: 3.29 MG/DL (ref 0.5–1.05)
CREAT SERPL-MCNC: 3.36 MG/DL (ref 0.5–1.05)
CREAT SERPL-MCNC: 3.44 MG/DL (ref 0.5–1.05)
CREAT SERPL-MCNC: 3.5 MG/DL (ref 0.5–1.05)
CREAT SERPL-MCNC: 3.5 MG/DL (ref 0.5–1.05)
CREAT SERPL-MCNC: 3.53 MG/DL (ref 0.5–1.05)
DISPENSE STATUS: NORMAL
EGFRCR SERPLBLD CKD-EPI 2021: 12 ML/MIN/1.73M*2
EGFRCR SERPLBLD CKD-EPI 2021: 13 ML/MIN/1.73M*2
EGFRCR SERPLBLD CKD-EPI 2021: 15 ML/MIN/1.73M*2
EOSINOPHIL # BLD AUTO: 0.18 X10*3/UL (ref 0–0.4)
EOSINOPHIL NFR BLD AUTO: 1.4 %
ERYTHROCYTE [DISTWIDTH] IN BLOOD BY AUTOMATED COUNT: 14.6 % (ref 11.5–14.5)
FIBRINOGEN PPP-MCNC: 170 MG/DL (ref 200–400)
GLUCOSE BLD MANUAL STRIP-MCNC: 202 MG/DL (ref 74–99)
GLUCOSE BLD MANUAL STRIP-MCNC: 223 MG/DL (ref 74–99)
GLUCOSE BLD MANUAL STRIP-MCNC: 248 MG/DL (ref 74–99)
GLUCOSE BLD MANUAL STRIP-MCNC: 294 MG/DL (ref 74–99)
GLUCOSE BLD MANUAL STRIP-MCNC: 297 MG/DL (ref 74–99)
GLUCOSE BLD MANUAL STRIP-MCNC: 300 MG/DL (ref 74–99)
GLUCOSE BLD MANUAL STRIP-MCNC: 449 MG/DL (ref 74–99)
GLUCOSE BLD MANUAL STRIP-MCNC: 499 MG/DL (ref 74–99)
GLUCOSE BLD MANUAL STRIP-MCNC: 514 MG/DL (ref 74–99)
GLUCOSE BLD MANUAL STRIP-MCNC: >600 MG/DL (ref 74–99)
GLUCOSE BLDA-MCNC: 212 MG/DL (ref 74–99)
GLUCOSE BLDA-MCNC: 321 MG/DL (ref 74–99)
GLUCOSE BLDA-MCNC: 454 MG/DL (ref 74–99)
GLUCOSE SERPL-MCNC: 200 MG/DL (ref 74–99)
GLUCOSE SERPL-MCNC: 200 MG/DL (ref 74–99)
GLUCOSE SERPL-MCNC: 323 MG/DL (ref 74–99)
GLUCOSE SERPL-MCNC: 575 MG/DL (ref 74–99)
GLUCOSE SERPL-MCNC: 663 MG/DL (ref 74–99)
GLUCOSE SERPL-MCNC: 789 MG/DL (ref 74–99)
GLUCOSE SERPL-MCNC: 899 MG/DL (ref 74–99)
GLUCOSE SERPL-MCNC: 990 MG/DL (ref 74–99)
GLUCOSE SERPL-MCNC: 998 MG/DL (ref 74–99)
GLUCOSE UR STRIP.AUTO-MCNC: ABNORMAL MG/DL
HCO3 BLDA-SCNC: 11.9 MMOL/L (ref 22–26)
HCO3 BLDA-SCNC: 16 MMOL/L (ref 22–26)
HCO3 BLDA-SCNC: 18.6 MMOL/L (ref 22–26)
HCT VFR BLD AUTO: 40.8 % (ref 36–46)
HCT VFR BLD AUTO: 49.3 % (ref 36–46)
HCT VFR BLD EST: 40 % (ref 36–46)
HCT VFR BLD EST: 40 % (ref 36–46)
HCT VFR BLD EST: 42 % (ref 36–46)
HGB BLD-MCNC: 12.9 G/DL (ref 12–16)
HGB BLD-MCNC: 15.7 G/DL (ref 12–16)
HGB BLDA-MCNC: 13.3 G/DL (ref 12–16)
HGB BLDA-MCNC: 13.4 G/DL (ref 12–16)
HGB BLDA-MCNC: 14 G/DL (ref 12–16)
HOLD SPECIMEN: NORMAL
HYALINE CASTS #/AREA URNS AUTO: ABNORMAL /LPF
IMM GRANULOCYTES # BLD AUTO: 0.07 X10*3/UL (ref 0–0.5)
IMM GRANULOCYTES NFR BLD AUTO: 0.5 % (ref 0–0.9)
INHALED O2 CONCENTRATION: 100 %
INR PPP: 1.2 (ref 0.9–1.1)
KETONES UR STRIP.AUTO-MCNC: NEGATIVE MG/DL
LACTATE BLDA-SCNC: 10.4 MMOL/L (ref 0.4–2)
LACTATE BLDA-SCNC: 6.4 MMOL/L (ref 0.4–2)
LACTATE BLDA-SCNC: 7.9 MMOL/L (ref 0.4–2)
LACTATE SERPL-SCNC: 5 MMOL/L (ref 0.4–2)
LACTATE SERPL-SCNC: 5.5 MMOL/L (ref 0.4–2)
LACTATE SERPL-SCNC: 6.6 MMOL/L (ref 0.4–2)
LACTATE SERPL-SCNC: 7.5 MMOL/L (ref 0.4–2)
LACTATE SERPL-SCNC: 7.5 MMOL/L (ref 0.4–2)
LACTATE SERPL-SCNC: 8.1 MMOL/L (ref 0.4–2)
LEUKOCYTE ESTERASE UR QL STRIP.AUTO: ABNORMAL
LIPASE SERPL-CCNC: 2885 U/L (ref 9–82)
LYMPHOCYTES # BLD AUTO: 0.67 X10*3/UL (ref 0.8–3)
LYMPHOCYTES NFR BLD AUTO: 5.2 %
MAGNESIUM SERPL-MCNC: 1.96 MG/DL (ref 1.6–2.4)
MAGNESIUM SERPL-MCNC: 2.11 MG/DL (ref 1.6–2.4)
MAGNESIUM SERPL-MCNC: 2.4 MG/DL (ref 1.6–2.4)
MAGNESIUM SERPL-MCNC: 2.45 MG/DL (ref 1.6–2.4)
MAGNESIUM SERPL-MCNC: 2.59 MG/DL (ref 1.6–2.4)
MAGNESIUM SERPL-MCNC: 2.74 MG/DL (ref 1.6–2.4)
MCH RBC QN AUTO: 26.7 PG (ref 26–34)
MCHC RBC AUTO-ENTMCNC: 31.8 G/DL (ref 32–36)
MCV RBC AUTO: 84 FL (ref 80–100)
MONOCYTES # BLD AUTO: 0.23 X10*3/UL (ref 0.05–0.8)
MONOCYTES NFR BLD AUTO: 1.8 %
MRSA DNA SPEC QL NAA+PROBE: NOT DETECTED
MUCOUS THREADS #/AREA URNS AUTO: ABNORMAL /LPF
NEUTROPHILS # BLD AUTO: 11.66 X10*3/UL (ref 1.6–5.5)
NEUTROPHILS NFR BLD AUTO: 90.8 %
NITRITE UR QL STRIP.AUTO: NEGATIVE
NRBC BLD-RTO: 0 /100 WBCS (ref 0–0)
OSMOLALITY SERPL: 390 MOSM/KG (ref 280–300)
OXYHGB MFR BLDA: 82.4 % (ref 94–98)
OXYHGB MFR BLDA: 91.2 % (ref 94–98)
OXYHGB MFR BLDA: 94 % (ref 94–98)
PCO2 BLDA: 40 MM HG (ref 38–42)
PCO2 BLDA: 45 MM HG (ref 38–42)
PCO2 BLDA: 56 MM HG (ref 38–42)
PH BLDA: 7.08 PH (ref 7.38–7.42)
PH BLDA: 7.13 PH (ref 7.38–7.42)
PH BLDA: 7.16 PH (ref 7.38–7.42)
PH UR STRIP.AUTO: 5 [PH]
PHOSPHATE SERPL-MCNC: 3 MG/DL (ref 2.5–4.9)
PHOSPHATE SERPL-MCNC: 3.2 MG/DL (ref 2.5–4.9)
PHOSPHATE SERPL-MCNC: 3.7 MG/DL (ref 2.5–4.9)
PHOSPHATE SERPL-MCNC: 4 MG/DL (ref 2.5–4.9)
PHOSPHATE SERPL-MCNC: 4.3 MG/DL (ref 2.5–4.9)
PHOSPHATE SERPL-MCNC: 4.4 MG/DL (ref 2.5–4.9)
PLATELET # BLD AUTO: 131 X10*3/UL (ref 150–450)
PO2 BLDA: 56 MM HG (ref 85–95)
PO2 BLDA: 72 MM HG (ref 85–95)
PO2 BLDA: 88 MM HG (ref 85–95)
POTASSIUM BLDA-SCNC: 4 MMOL/L (ref 3.5–5.3)
POTASSIUM BLDA-SCNC: 4 MMOL/L (ref 3.5–5.3)
POTASSIUM BLDA-SCNC: 4.1 MMOL/L (ref 3.5–5.3)
POTASSIUM SERPL-SCNC: 3.9 MMOL/L (ref 3.5–5.3)
POTASSIUM SERPL-SCNC: 4.1 MMOL/L (ref 3.5–5.3)
POTASSIUM SERPL-SCNC: 4.1 MMOL/L (ref 3.5–5.3)
POTASSIUM SERPL-SCNC: 4.2 MMOL/L (ref 3.5–5.3)
POTASSIUM SERPL-SCNC: 4.3 MMOL/L (ref 3.5–5.3)
POTASSIUM SERPL-SCNC: 4.6 MMOL/L (ref 3.5–5.3)
PRODUCT BLOOD TYPE: 5100
PRODUCT CODE: NORMAL
PROT SERPL-MCNC: 4.9 G/DL (ref 6.4–8.2)
PROT SERPL-MCNC: 6.4 G/DL (ref 6.4–8.2)
PROT UR STRIP.AUTO-MCNC: ABNORMAL MG/DL
PROTHROMBIN TIME: 13 SECONDS (ref 9.8–12.8)
RBC # BLD AUTO: 5.88 X10*6/UL (ref 4–5.2)
RBC # UR STRIP.AUTO: NEGATIVE MG/DL
RBC #/AREA URNS AUTO: ABNORMAL /HPF
RH FACTOR (ANTIGEN D): NORMAL
RH FACTOR (ANTIGEN D): NORMAL
SAO2 % BLDA: 84 % (ref 94–100)
SAO2 % BLDA: 93 % (ref 94–100)
SAO2 % BLDA: 96 % (ref 94–100)
SODIUM BLDA-SCNC: 134 MMOL/L (ref 136–145)
SODIUM BLDA-SCNC: 136 MMOL/L (ref 136–145)
SODIUM BLDA-SCNC: 137 MMOL/L (ref 136–145)
SODIUM SERPL-SCNC: 132 MMOL/L (ref 136–145)
SODIUM SERPL-SCNC: 136 MMOL/L (ref 136–145)
SODIUM SERPL-SCNC: 137 MMOL/L (ref 136–145)
SODIUM SERPL-SCNC: 138 MMOL/L (ref 136–145)
SODIUM SERPL-SCNC: 139 MMOL/L (ref 136–145)
SODIUM SERPL-SCNC: 140 MMOL/L (ref 136–145)
SP GR UR STRIP.AUTO: 1.02
SQUAMOUS #/AREA URNS AUTO: ABNORMAL /HPF
UNIT ABO: NORMAL
UNIT NUMBER: NORMAL
UNIT RH: NORMAL
UNIT VOLUME: 292
UROBILINOGEN UR STRIP.AUTO-MCNC: NORMAL MG/DL
WBC # BLD AUTO: 12.9 X10*3/UL (ref 4.4–11.3)
WBC #/AREA URNS AUTO: ABNORMAL /HPF
WBC CLUMPS #/AREA URNS AUTO: ABNORMAL /HPF
XM INTEP: NORMAL

## 2025-02-16 PROCEDURE — 36556 INSERT NON-TUNNEL CV CATH: CPT | Performed by: INTERNAL MEDICINE

## 2025-02-16 PROCEDURE — 31500 INSERT EMERGENCY AIRWAY: CPT | Performed by: INTERNAL MEDICINE

## 2025-02-16 PROCEDURE — 86901 BLOOD TYPING SEROLOGIC RH(D): CPT | Performed by: INTERNAL MEDICINE

## 2025-02-16 PROCEDURE — 2500000005 HC RX 250 GENERAL PHARMACY W/O HCPCS

## 2025-02-16 PROCEDURE — 0BH17EZ INSERTION OF ENDOTRACHEAL AIRWAY INTO TRACHEA, VIA NATURAL OR ARTIFICIAL OPENING: ICD-10-PCS | Performed by: INTERNAL MEDICINE

## 2025-02-16 PROCEDURE — P9047 ALBUMIN (HUMAN), 25%, 50ML: HCPCS

## 2025-02-16 PROCEDURE — 84132 ASSAY OF SERUM POTASSIUM: CPT | Performed by: INTERNAL MEDICINE

## 2025-02-16 PROCEDURE — 84132 ASSAY OF SERUM POTASSIUM: CPT

## 2025-02-16 PROCEDURE — 82248 BILIRUBIN DIRECT: CPT

## 2025-02-16 PROCEDURE — 94681 O2 UPTK CO2 OUTP % O2 XTRC: CPT

## 2025-02-16 PROCEDURE — 83605 ASSAY OF LACTIC ACID: CPT

## 2025-02-16 PROCEDURE — 86923 COMPATIBILITY TEST ELECTRIC: CPT

## 2025-02-16 PROCEDURE — 2500000004 HC RX 250 GENERAL PHARMACY W/ HCPCS (ALT 636 FOR OP/ED)

## 2025-02-16 PROCEDURE — 99223 1ST HOSP IP/OBS HIGH 75: CPT | Performed by: INTERNAL MEDICINE

## 2025-02-16 PROCEDURE — 99291 CRITICAL CARE FIRST HOUR: CPT

## 2025-02-16 PROCEDURE — 82947 ASSAY GLUCOSE BLOOD QUANT: CPT

## 2025-02-16 PROCEDURE — 86334 IMMUNOFIX E-PHORESIS SERUM: CPT | Mod: PORLAB | Performed by: INTERNAL MEDICINE

## 2025-02-16 PROCEDURE — 85025 COMPLETE CBC W/AUTO DIFF WBC: CPT

## 2025-02-16 PROCEDURE — 83521 IG LIGHT CHAINS FREE EACH: CPT | Mod: PORLAB | Performed by: INTERNAL MEDICINE

## 2025-02-16 PROCEDURE — 71045 X-RAY EXAM CHEST 1 VIEW: CPT | Performed by: RADIOLOGY

## 2025-02-16 PROCEDURE — 87040 BLOOD CULTURE FOR BACTERIA: CPT | Mod: PORLAB

## 2025-02-16 PROCEDURE — 2500000004 HC RX 250 GENERAL PHARMACY W/ HCPCS (ALT 636 FOR OP/ED): Mod: JZ | Performed by: INTERNAL MEDICINE

## 2025-02-16 PROCEDURE — 84484 ASSAY OF TROPONIN QUANT: CPT

## 2025-02-16 PROCEDURE — 83036 HEMOGLOBIN GLYCOSYLATED A1C: CPT | Mod: PORLAB | Performed by: INTERNAL MEDICINE

## 2025-02-16 PROCEDURE — 2020000001 HC ICU ROOM DAILY

## 2025-02-16 PROCEDURE — 82550 ASSAY OF CK (CPK): CPT | Performed by: INTERNAL MEDICINE

## 2025-02-16 PROCEDURE — 99233 SBSQ HOSP IP/OBS HIGH 50: CPT | Performed by: INTERNAL MEDICINE

## 2025-02-16 PROCEDURE — 80048 BASIC METABOLIC PNL TOTAL CA: CPT | Mod: CCI | Performed by: INTERNAL MEDICINE

## 2025-02-16 PROCEDURE — 2500000004 HC RX 250 GENERAL PHARMACY W/ HCPCS (ALT 636 FOR OP/ED): Performed by: INTERNAL MEDICINE

## 2025-02-16 PROCEDURE — 83690 ASSAY OF LIPASE: CPT | Performed by: INTERNAL MEDICINE

## 2025-02-16 PROCEDURE — 96361 HYDRATE IV INFUSION ADD-ON: CPT | Mod: 59

## 2025-02-16 PROCEDURE — 80048 BASIC METABOLIC PNL TOTAL CA: CPT

## 2025-02-16 PROCEDURE — 2500000004 HC RX 250 GENERAL PHARMACY W/ HCPCS (ALT 636 FOR OP/ED): Performed by: STUDENT IN AN ORGANIZED HEALTH CARE EDUCATION/TRAINING PROGRAM

## 2025-02-16 PROCEDURE — 37799 UNLISTED PX VASCULAR SURGERY: CPT

## 2025-02-16 PROCEDURE — 96366 THER/PROPH/DIAG IV INF ADDON: CPT | Mod: 59

## 2025-02-16 PROCEDURE — 02HV33Z INSERTION OF INFUSION DEVICE INTO SUPERIOR VENA CAVA, PERCUTANEOUS APPROACH: ICD-10-PCS | Performed by: INTERNAL MEDICINE

## 2025-02-16 PROCEDURE — 37799 UNLISTED PX VASCULAR SURGERY: CPT | Performed by: INTERNAL MEDICINE

## 2025-02-16 PROCEDURE — 70450 CT HEAD/BRAIN W/O DYE: CPT | Performed by: RADIOLOGY

## 2025-02-16 PROCEDURE — 84155 ASSAY OF PROTEIN SERUM: CPT | Mod: PORLAB | Performed by: INTERNAL MEDICINE

## 2025-02-16 PROCEDURE — 99222 1ST HOSP IP/OBS MODERATE 55: CPT | Performed by: INTERNAL MEDICINE

## 2025-02-16 PROCEDURE — 94002 VENT MGMT INPAT INIT DAY: CPT

## 2025-02-16 PROCEDURE — 83735 ASSAY OF MAGNESIUM: CPT

## 2025-02-16 PROCEDURE — 82947 ASSAY GLUCOSE BLOOD QUANT: CPT | Performed by: EMERGENCY MEDICINE

## 2025-02-16 PROCEDURE — 2500000005 HC RX 250 GENERAL PHARMACY W/O HCPCS: Performed by: INTERNAL MEDICINE

## 2025-02-16 PROCEDURE — 36415 COLL VENOUS BLD VENIPUNCTURE: CPT

## 2025-02-16 PROCEDURE — 85014 HEMATOCRIT: CPT | Performed by: INTERNAL MEDICINE

## 2025-02-16 PROCEDURE — 5A1935Z RESPIRATORY VENTILATION, LESS THAN 24 CONSECUTIVE HOURS: ICD-10-PCS | Performed by: INTERNAL MEDICINE

## 2025-02-16 PROCEDURE — 85384 FIBRINOGEN ACTIVITY: CPT | Performed by: INTERNAL MEDICINE

## 2025-02-16 PROCEDURE — 94799 UNLISTED PULMONARY SVC/PX: CPT

## 2025-02-16 PROCEDURE — 83930 ASSAY OF BLOOD OSMOLALITY: CPT | Mod: PORLAB | Performed by: INTERNAL MEDICINE

## 2025-02-16 PROCEDURE — 86301 IMMUNOASSAY TUMOR CA 19-9: CPT | Mod: PORLAB | Performed by: INTERNAL MEDICINE

## 2025-02-16 PROCEDURE — 96376 TX/PRO/DX INJ SAME DRUG ADON: CPT | Mod: 59

## 2025-02-16 PROCEDURE — 81001 URINALYSIS AUTO W/SCOPE: CPT | Performed by: STUDENT IN AN ORGANIZED HEALTH CARE EDUCATION/TRAINING PROGRAM

## 2025-02-16 PROCEDURE — 99291 CRITICAL CARE FIRST HOUR: CPT | Performed by: STUDENT IN AN ORGANIZED HEALTH CARE EDUCATION/TRAINING PROGRAM

## 2025-02-16 PROCEDURE — 85610 PROTHROMBIN TIME: CPT | Performed by: INTERNAL MEDICINE

## 2025-02-16 PROCEDURE — 87641 MR-STAPH DNA AMP PROBE: CPT

## 2025-02-16 PROCEDURE — 84100 ASSAY OF PHOSPHORUS: CPT

## 2025-02-16 PROCEDURE — 31500 INSERT EMERGENCY AIRWAY: CPT

## 2025-02-16 PROCEDURE — 87086 URINE CULTURE/COLONY COUNT: CPT | Mod: PORLAB | Performed by: STUDENT IN AN ORGANIZED HEALTH CARE EDUCATION/TRAINING PROGRAM

## 2025-02-16 PROCEDURE — 74018 RADEX ABDOMEN 1 VIEW: CPT | Performed by: RADIOLOGY

## 2025-02-16 PROCEDURE — 99291 CRITICAL CARE FIRST HOUR: CPT | Performed by: INTERNAL MEDICINE

## 2025-02-16 RX ORDER — SODIUM CHLORIDE AND POTASSIUM CHLORIDE 150; 450 MG/100ML; MG/100ML
150 INJECTION, SOLUTION INTRAVENOUS CONTINUOUS
Status: DISCONTINUED | OUTPATIENT
Start: 2025-02-16 | End: 2025-02-16

## 2025-02-16 RX ORDER — VASOPRESSIN 20 UNIT/100 ML (0.2 UNIT/ML) IN 0.9 % SODIUM CHLORIDE IV
0.03 SOLUTION CONTINUOUS
Status: DISCONTINUED | OUTPATIENT
Start: 2025-02-16 | End: 2025-02-17

## 2025-02-16 RX ORDER — CEFTRIAXONE 1 G/50ML
1 INJECTION, SOLUTION INTRAVENOUS EVERY 24 HOURS
Status: DISCONTINUED | OUTPATIENT
Start: 2025-02-16 | End: 2025-02-16

## 2025-02-16 RX ORDER — ROCURONIUM BROMIDE 10 MG/ML
INJECTION, SOLUTION INTRAVENOUS
Status: COMPLETED
Start: 2025-02-16 | End: 2025-02-16

## 2025-02-16 RX ORDER — VASOPRESSIN 20 [USP'U]/ML
INJECTION, SOLUTION INTRAVENOUS
Status: COMPLETED
Start: 2025-02-16 | End: 2025-02-16

## 2025-02-16 RX ORDER — INSULIN LISPRO 100 [IU]/ML
0-10 INJECTION, SOLUTION INTRAVENOUS; SUBCUTANEOUS EVERY 4 HOURS
Status: DISCONTINUED | OUTPATIENT
Start: 2025-02-16 | End: 2025-02-16

## 2025-02-16 RX ORDER — PANTOPRAZOLE SODIUM 40 MG/10ML
40 INJECTION, POWDER, LYOPHILIZED, FOR SOLUTION INTRAVENOUS
Status: DISCONTINUED | OUTPATIENT
Start: 2025-02-16 | End: 2025-02-17

## 2025-02-16 RX ORDER — PANTOPRAZOLE SODIUM 40 MG/10ML
40 INJECTION, POWDER, LYOPHILIZED, FOR SOLUTION INTRAVENOUS
Status: DISCONTINUED | OUTPATIENT
Start: 2025-02-17 | End: 2025-02-16 | Stop reason: SDUPTHER

## 2025-02-16 RX ORDER — PANTOPRAZOLE SODIUM 40 MG/1
40 TABLET, DELAYED RELEASE ORAL
Status: DISCONTINUED | OUTPATIENT
Start: 2025-02-17 | End: 2025-02-16 | Stop reason: SDUPTHER

## 2025-02-16 RX ORDER — INSULIN LISPRO 100 [IU]/ML
0-10 INJECTION, SOLUTION INTRAVENOUS; SUBCUTANEOUS EVERY 4 HOURS
Status: DISCONTINUED | OUTPATIENT
Start: 2025-02-16 | End: 2025-02-17

## 2025-02-16 RX ORDER — ALBUMIN HUMAN 250 G/1000ML
SOLUTION INTRAVENOUS
Status: COMPLETED
Start: 2025-02-16 | End: 2025-02-16

## 2025-02-16 RX ORDER — DEXTROSE 50 % IN WATER (D50W) INTRAVENOUS SYRINGE
25
Status: DISCONTINUED | OUTPATIENT
Start: 2025-02-16 | End: 2025-02-17

## 2025-02-16 RX ORDER — DEXTROSE 50 % IN WATER (D50W) INTRAVENOUS SYRINGE
12.5
Status: DISCONTINUED | OUTPATIENT
Start: 2025-02-16 | End: 2025-02-17

## 2025-02-16 RX ORDER — NOREPINEPHRINE BITARTRATE/D5W 16MG/250ML
0-1 PLASTIC BAG, INJECTION (ML) INTRAVENOUS CONTINUOUS
Status: DISCONTINUED | OUTPATIENT
Start: 2025-02-16 | End: 2025-02-17

## 2025-02-16 RX ORDER — MEROPENEM 1 G/1
1000 INJECTION, POWDER, FOR SOLUTION INTRAVENOUS EVERY 24 HOURS
Status: DISCONTINUED | OUTPATIENT
Start: 2025-02-16 | End: 2025-02-17

## 2025-02-16 RX ORDER — ETOMIDATE 2 MG/ML
INJECTION INTRAVENOUS
Status: COMPLETED
Start: 2025-02-16 | End: 2025-02-16

## 2025-02-16 RX ORDER — NOREPINEPHRINE BITARTRATE/D5W 8 MG/250ML
0-1 PLASTIC BAG, INJECTION (ML) INTRAVENOUS CONTINUOUS
Status: DISCONTINUED | OUTPATIENT
Start: 2025-02-16 | End: 2025-02-16

## 2025-02-16 RX ORDER — FENTANYL CITRATE-0.9 % NACL/PF 10 MCG/ML
0-200 PLASTIC BAG, INJECTION (ML) INTRAVENOUS CONTINUOUS
Status: DISCONTINUED | OUTPATIENT
Start: 2025-02-16 | End: 2025-02-17

## 2025-02-16 RX ORDER — VANCOMYCIN HYDROCHLORIDE 1 G/20ML
INJECTION, POWDER, LYOPHILIZED, FOR SOLUTION INTRAVENOUS DAILY PRN
Status: DISCONTINUED | OUTPATIENT
Start: 2025-02-16 | End: 2025-02-17

## 2025-02-16 RX ORDER — PROPOFOL 10 MG/ML
0-20 INJECTION, EMULSION INTRAVENOUS CONTINUOUS
Status: DISCONTINUED | OUTPATIENT
Start: 2025-02-16 | End: 2025-02-17

## 2025-02-16 RX ORDER — CEFTRIAXONE 2 G/50ML
2 INJECTION, SOLUTION INTRAVENOUS EVERY 24 HOURS
Status: DISCONTINUED | OUTPATIENT
Start: 2025-02-17 | End: 2025-02-16

## 2025-02-16 RX ORDER — ALBUMIN HUMAN 250 G/1000ML
25 SOLUTION INTRAVENOUS ONCE
Status: COMPLETED | OUTPATIENT
Start: 2025-02-16 | End: 2025-02-16

## 2025-02-16 RX ADMIN — Medication 100 PERCENT: at 15:00

## 2025-02-16 RX ADMIN — NOREPINEPHRINE BITARTRATE 0.45 MCG/KG/MIN: 8 INJECTION, SOLUTION INTRAVENOUS at 16:10

## 2025-02-16 RX ADMIN — POTASSIUM CHLORIDE 150 ML/HR: 2 INJECTION, SOLUTION, CONCENTRATE INTRAVENOUS at 03:59

## 2025-02-16 RX ADMIN — SODIUM CHLORIDE, POTASSIUM CHLORIDE, SODIUM LACTATE AND CALCIUM CHLORIDE 1000 ML: 600; 310; 30; 20 INJECTION, SOLUTION INTRAVENOUS at 21:19

## 2025-02-16 RX ADMIN — SODIUM CHLORIDE 1000 ML: 9 INJECTION, SOLUTION INTRAVENOUS at 14:10

## 2025-02-16 RX ADMIN — Medication 100 PERCENT: at 23:24

## 2025-02-16 RX ADMIN — INSULIN HUMAN 4.3 UNITS/HR: 1 INJECTION, SOLUTION INTRAVENOUS at 16:21

## 2025-02-16 RX ADMIN — VASOPRESSIN IN 0.9% SODIUM CHLORIDE 0.03 UNITS/MIN: 20 INJECTION INTRAVENOUS at 14:07

## 2025-02-16 RX ADMIN — VASOPRESSIN IN 0.9% SODIUM CHLORIDE 0.03 UNITS/MIN: 20 INJECTION INTRAVENOUS at 21:00

## 2025-02-16 RX ADMIN — ROCURONIUM BROMIDE 50 MG: 10 INJECTION, SOLUTION INTRAVENOUS at 10:32

## 2025-02-16 RX ADMIN — VASOPRESSIN: 20 INJECTION INTRAVENOUS at 14:09

## 2025-02-16 RX ADMIN — SODIUM BICARBONATE 125 ML/HR: 84 INJECTION, SOLUTION INTRAVENOUS at 21:10

## 2025-02-16 RX ADMIN — MEROPENEM 1000 MG: 1 INJECTION, POWDER, FOR SOLUTION INTRAVENOUS at 23:00

## 2025-02-16 RX ADMIN — Medication 100 PERCENT: at 20:39

## 2025-02-16 RX ADMIN — PANTOPRAZOLE SODIUM 40 MG: 40 INJECTION, POWDER, FOR SOLUTION INTRAVENOUS at 06:15

## 2025-02-16 RX ADMIN — NOREPINEPHRINE BITARTRATE 0.4 MCG/KG/MIN: 16 INJECTION, SOLUTION INTRAVENOUS at 23:10

## 2025-02-16 RX ADMIN — HYDROCORTISONE SODIUM SUCCINATE 100 MG: 100 INJECTION, POWDER, FOR SOLUTION INTRAMUSCULAR; INTRAVENOUS at 17:58

## 2025-02-16 RX ADMIN — POTASSIUM CHLORIDE 20 MEQ: 200 INJECTION, SOLUTION INTRAVENOUS at 02:03

## 2025-02-16 RX ADMIN — POTASSIUM CHLORIDE 150 ML/HR: 2 INJECTION, SOLUTION, CONCENTRATE INTRAVENOUS at 14:21

## 2025-02-16 RX ADMIN — ALBUMIN HUMAN 25 G: 250 SOLUTION INTRAVENOUS at 14:08

## 2025-02-16 RX ADMIN — SODIUM CHLORIDE, POTASSIUM CHLORIDE, SODIUM LACTATE AND CALCIUM CHLORIDE 500 ML: 600; 310; 30; 20 INJECTION, SOLUTION INTRAVENOUS at 05:09

## 2025-02-16 RX ADMIN — PANTOPRAZOLE SODIUM 40 MG: 40 INJECTION, POWDER, FOR SOLUTION INTRAVENOUS at 19:29

## 2025-02-16 RX ADMIN — INSULIN HUMAN 5.6 UNITS/HR: 1 INJECTION, SOLUTION INTRAVENOUS at 10:27

## 2025-02-16 RX ADMIN — PROPOFOL 5 MCG/KG/MIN: 10 INJECTION, EMULSION INTRAVENOUS at 10:58

## 2025-02-16 RX ADMIN — CEFTRIAXONE SODIUM 1 G: 1 INJECTION, SOLUTION INTRAVENOUS at 05:08

## 2025-02-16 RX ADMIN — ALBUMIN HUMAN: 0.25 SOLUTION INTRAVENOUS at 10:33

## 2025-02-16 RX ADMIN — NOREPINEPHRINE BITARTRATE 0.6 MCG/KG/MIN: 8 INJECTION, SOLUTION INTRAVENOUS at 20:00

## 2025-02-16 RX ADMIN — INSULIN HUMAN 4.5 UNITS/HR: 1 INJECTION, SOLUTION INTRAVENOUS at 07:35

## 2025-02-16 RX ADMIN — Medication 100 PERCENT: at 13:16

## 2025-02-16 RX ADMIN — Medication 100 PERCENT: at 10:08

## 2025-02-16 RX ADMIN — ETOMIDATE INJECTION 40 MG: 2 SOLUTION INTRAVENOUS at 10:32

## 2025-02-16 RX ADMIN — HEPARIN SODIUM 5000 UNITS: 5000 INJECTION, SOLUTION INTRAVENOUS; SUBCUTANEOUS at 05:08

## 2025-02-16 RX ADMIN — Medication 100 PERCENT: at 20:00

## 2025-02-16 RX ADMIN — INSULIN HUMAN 8.7 UNITS/HR: 1 INJECTION, SOLUTION INTRAVENOUS at 14:23

## 2025-02-16 RX ADMIN — ALBUMIN HUMAN 25 G: 0.25 SOLUTION INTRAVENOUS at 14:08

## 2025-02-16 ASSESSMENT — COGNITIVE AND FUNCTIONAL STATUS - GENERAL
DAILY ACTIVITIY SCORE: 6
TURNING FROM BACK TO SIDE WHILE IN FLAT BAD: TOTAL
DRESSING REGULAR UPPER BODY CLOTHING: TOTAL
CLIMB 3 TO 5 STEPS WITH RAILING: TOTAL
STANDING UP FROM CHAIR USING ARMS: TOTAL
PERSONAL GROOMING: TOTAL
MOVING TO AND FROM BED TO CHAIR: TOTAL
WALKING IN HOSPITAL ROOM: TOTAL
HELP NEEDED FOR BATHING: TOTAL
TOILETING: TOTAL
MOBILITY SCORE: 6
DRESSING REGULAR LOWER BODY CLOTHING: TOTAL
EATING MEALS: TOTAL
MOVING FROM LYING ON BACK TO SITTING ON SIDE OF FLAT BED WITH BEDRAILS: TOTAL

## 2025-02-16 ASSESSMENT — PAIN - FUNCTIONAL ASSESSMENT
PAIN_FUNCTIONAL_ASSESSMENT: CPOT (CRITICAL CARE PAIN OBSERVATION TOOL)
PAIN_FUNCTIONAL_ASSESSMENT: CPOT (CRITICAL CARE PAIN OBSERVATION TOOL)

## 2025-02-16 ASSESSMENT — ENCOUNTER SYMPTOMS
CONFUSION: 1
COUGH: 1
FATIGUE: 1
APPETITE CHANGE: 1
ACTIVITY CHANGE: 1

## 2025-02-16 ASSESSMENT — PAIN SCALES - GENERAL: PAINLEVEL_OUTOF10: 0 - NO PAIN

## 2025-02-16 NOTE — CARE PLAN
Problem: Safety - Medical Restraint  Goal: Remains free of injury from restraints (Restraint for Interference with Medical Device)  Outcome: Progressing  Goal: Free from restraint(s) (Restraint for Interference with Medical Device)  Outcome: Progressing     Problem: Pain - Adult  Goal: Verbalizes/displays adequate comfort level or baseline comfort level  Outcome: Progressing     Problem: Safety - Adult  Goal: Free from fall injury  Outcome: Progressing     Problem: Discharge Planning  Goal: Discharge to home or other facility with appropriate resources  Outcome: Progressing     Problem: Chronic Conditions and Co-morbidities  Goal: Patient's chronic conditions and co-morbidity symptoms are monitored and maintained or improved  Outcome: Progressing     Problem: Nutrition  Goal: Nutrient intake appropriate for maintaining nutritional needs  Outcome: Progressing     Problem: Skin  Goal: Decreased wound size/increased tissue granulation at next dressing change  Outcome: Progressing  Goal: Participates in plan/prevention/treatment measures  Outcome: Progressing  Goal: Prevent/manage excess moisture  Outcome: Progressing  Goal: Prevent/minimize sheer/friction injuries  Outcome: Progressing  Goal: Promote/optimize nutrition  Outcome: Progressing  Goal: Promote skin healing  Outcome: Progressing     Problem: Diabetes  Goal: Achieve decreasing blood glucose levels by end of shift  Outcome: Progressing  Goal: Increase stability of blood glucose readings by end of shift  Outcome: Progressing  Goal: Decrease in ketones present in urine by end of shift  Outcome: Progressing  Goal: Maintain electrolyte levels within acceptable range throughout shift  Outcome: Progressing  Goal: Maintain glucose levels >70mg/dl to <250mg/dl throughout shift  Outcome: Progressing  Goal: No changes in neurological exam by end of shift  Outcome: Progressing  Goal: Learn about and adhere to nutrition recommendations by end of shift  Outcome:  Progressing  Goal: Vital signs within normal range for age by end of shift  Outcome: Progressing  Goal: Increase self care and/or family involovement by end of shift  Outcome: Progressing  Goal: Receive DSME education by end of shift  Outcome: Progressing     Problem: Knowledge Deficit  Goal: Patient/family/caregiver demonstrates understanding of disease process, treatment plan, medications, and discharge instructions  Outcome: Progressing     Problem: Mechanical Ventilation  Goal: Patient Will Maintain Patent Airway  Outcome: Progressing  Goal: Oral health is maintained or improved  Outcome: Progressing  Goal: Tracheostomy will be managed safely  Outcome: Progressing  Goal: ET tube will be managed safely  Outcome: Progressing  Goal: Ability to express needs and understand communication  Outcome: Progressing  Goal: Mobility/activity is maintained at optimum level for patient  Outcome: Progressing   The patient's goals for the shift include      The clinical goals for the shift include HEMODYNAMIC STABILITY

## 2025-02-16 NOTE — CONSULTS
Reason For Consult  JAKY on CKD    History Of Present Illness  Марина Lozano is a 84 y.o. female with PMHx of nonID DM2, (diagnosed 2005), HTN, HLD, CKD, throat cancer (2007) who presented with HHS and pancreatitis. Patient altered on presentation so hx obtained from family member, daughter reports herself and her sister had been taking care of patient over past week. Pt lives alone and initially was having sx of fatigue, cough and congestion. No sputum production fever or chills noted. Poor po intake. On evening of presentation to ED patient's daughter found her to be acutely confused with garbled speech. EMS called and patient transferred to ED. Per family report patient had not been taking her medications as prescribed. In ED VSS. Labs significant for glucose 1,248, Na 134, K 3.4, Cl 85, bicarb 20, AG 32, BUN 53, Cr 2.97, lipase 4361, beta-hydroxybutyrate 6.02, WBC 12.8, hgb 16.9, VBG pH 7.26, lactacte 8.9, pCO2 53. Imaging concerning for pancreatitis. Patient started on insulin gtt, 2 L bolus LR, K-run started and she was admitted to ICU for further evaluation and management.  In ICU patient underwent intubation.       Nephrology is consulted for evaluation and management of JAKY on CKD. Scr is currently 2.32, worsening from 3.03 prior. As high as 2.97 on admit. Patient's baseline creatinine appears to be 1.5. Does have hx of chronic kidney disease stage 3. Per chart review patient does not appear to follow outpatient with a nephrologist.  Risk factors for CKD 1.  DM, uncontrolled  2. HTN  3. Age  4. HLD    Home medications notable for amlodipine-benazepril 10-20 mg/day, hydrochlorothiazide 12.5 mg/day, glipizide 5 mg BID, and metformin 500 mg BID. Per family report to ED, unclear whether or not patient had been taking her medications as prescribed.  No NSAIDs noted in med rec.   Patient is a former smoker, does not use alcohol or drugs.     Urinalysis reviewed: significant for turbid sample, glucosuria, trace  proteinuria, WBCs (6-10), RBCs (6-10), bacteria (1+), nitrite negative, leuk esterase (250), and occasional hyaline casts.     Patient unable to participate in exam due to intubation/sedation. No family present. Information for HPI obtained through chart review and interview with staff.     Past Medical History  She has a past medical history of Encounter for immunization (03/24/2022) and Throat cancer (Multi) (04/17/2023).    Surgical History  She has a past surgical history that includes Other surgical history (11/19/2019); Other surgical history (11/19/2019); and Other surgical history (11/19/2019).     Social History  She reports that she has quit smoking. Her smoking use included cigarettes. She has never used smokeless tobacco. She reports that she does not currently use alcohol. She reports that she does not use drugs.    Family History  Family History   Problem Relation Name Age of Onset    Coronary artery disease Father          Allergies  Codeine    Review of Systems  ROS unable to be performed 2/2 intubation/sedation.      Physical Exam  Physical Exam  Constitutional:       Comments: Patient being prepared for central line placement, limited exam   HENT:      Head: Normocephalic and atraumatic.      Mouth/Throat:      Comments: OG tube to suction, bilious output in canister  Eyes:      General: No scleral icterus.  Cardiovascular:      Rate and Rhythm: Regular rhythm. Tachycardia present.   Pulmonary:      Comments: Intubated: FiO2 100%  Abdominal:      General: Bowel sounds are decreased. There is distension.   Genitourinary:     Comments: Lomeli catheter present, no urine noted in bag  Musculoskeletal:      Right lower leg: No edema.      Left lower leg: No edema.   Skin:     General: Skin is warm and dry.   Neurological:      Comments: Sedated   Psychiatric:      Comments: [facility]            I&O 24HR    Intake/Output Summary (Last 24 hours) at 2/16/2025 0904  Last data filed at 2/15/2025 2356  Gross per 24  "hour   Intake 7.02 ml   Output --   Net 7.02 ml       Vitals 24HR  Heart Rate:  []   Temp:  [36 °C (96.8 °F)-36.4 °C (97.5 °F)]   Resp:  [23-57]   BP: ()/(62-89)   Height:  [167.6 cm (5' 6\")]   Weight:  [69.7 kg (153 lb 10.6 oz)-71.6 kg (157 lb 13.6 oz)]   SpO2:  [94 %-97 %]       Relevant Results  Results from last 7 days   Lab Units 02/16/25  0622 02/16/25  0411 02/16/25  0255 02/15/25  2313   SODIUM mmol/L 137  --  132* 137   POTASSIUM mmol/L 3.9  --  4.6 3.0*   CHLORIDE mmol/L 94*  --  94* 92*   CO2 mmol/L 20*  --  16* 21   BUN mg/dL 56*  --  55* 54*   CREATININE mg/dL 3.23*  --  3.03* 2.98*   GLUCOSE mg/dL 899*   < > 998* 1,035*   CALCIUM mg/dL 8.9  --  8.8 9.2    < > = values in this interval not displayed.      Results from last 7 days   Lab Units 02/16/25  0548 02/15/25  1925   WBC AUTO x10*3/uL 12.9* 12.8*   HEMOGLOBIN g/dL 15.7 16.9*   HEMATOCRIT % 49.3* 54.0*   PLATELETS AUTO x10*3/uL 131* 234         Assessment/Plan   JAKY on CKD3b 2/2 volume depletion N17.9, N18.32  HHS E11.01  HAGMA/ lactic acidosis E87.20  Acute pancreatitis K85.90  Acute hypoxic respiratory failure J96.01  Atrophic left kidney     Recommendations  Scr currently 3.23, baseline ~ 1.5, anuric, BP soft  Continue to trend creatinine and monitor urine output closely  No acute indication for RRT at this time, Lomeli placed earlier this morning, patient anuric, likely 2/2 profound volume depletion in setting of HHS, remains at risk for requiring dialysis this admission, monitor volume status closely   Need strict I&Os  JAKY workup ordered and pending     Glucose initially > 1000, most recently noted at 663.   Continue to trend lactate: 6.6 > 5.0  >7.5   Receiving aggressive IVF hydration per [facility] protocol   Monitor potassium and calcium levels closely     BP borderline low, hold home blood pressure medications     Avoid IV contrast and nephrotoxins  Dose all meds per current eGFR < 15  DM/[facility] management per primary/endocrinology "   Wean off ventilator as able, defer to ICU  GI and cardiology consults pending  Rest of management per ICU    Plan d/w ICU team     Thank you for the consult and the opportunity to participate in the care of this patient. Please do not hesitate to call with any questions or concerns.    SANTANA Roberto, PA-C  [facility]  Office [phone]

## 2025-02-16 NOTE — ED PROVIDER NOTES
HPI   Chief Complaint   Patient presents with    Altered Mental Status       This is a 84-year-old female past medical history of hypertension, type 2 diabetes on insulin, hyperlipidemia who presents emerged department for altered mental status she was last seen normal 3 days ago.  Per family she was found walking around her house naked and confused.  Patient states that she has taken her insulin because she is out of it but then she will states she took her insulin but has not been eating well.  She is alert and oriented to self.  She is complaining of some abdominal discomfort.                          [place] Coma Scale Score: 14                  Patient History   Past Medical History:   Diagnosis Date    Encounter for immunization 03/24/2022    Need for shingles vaccine    Throat cancer (Multi) 04/17/2023     Past Surgical History:   Procedure Laterality Date    OTHER SURGICAL HISTORY  11/19/2019    Leg surgery    OTHER SURGICAL HISTORY  11/19/2019    Hysterectomy    OTHER SURGICAL HISTORY  11/19/2019    Ear surgery     Family History   Problem Relation Name Age of Onset    Coronary artery disease Father       Social History     Tobacco Use    Smoking status: Former     Types: Cigarettes    Smokeless tobacco: Never   Vaping Use    Vaping status: Never Used   Substance Use Topics    Alcohol use: Not Currently    Drug use: Never       Physical Exam   ED Triage Vitals [02/15/25 1908]   Temperature Heart Rate Respirations BP   36 °C (96.8 °F) (!) 101 (!) 23 97/69      Pulse Ox Temp Source Heart Rate Source Patient Position   94 % Temporal Monitor Lying      BP Location FiO2 (%)     Left arm --       Physical Exam  Constitutional:       General: She is not in acute distress.  HENT:      Head: Normocephalic and atraumatic.      Right Ear: Tympanic membrane normal.      Left Ear: Tympanic membrane normal.      Mouth/Throat:      Mouth: Mucous membranes are moist.   Eyes:      Extraocular Movements: Extraocular  movements intact.      Conjunctiva/sclera: Conjunctivae normal.      Pupils: Pupils are equal, round, and reactive to light.   Cardiovascular:      Rate and Rhythm: Normal rate and regular rhythm.      Heart sounds: No murmur heard.  Pulmonary:      Effort: Pulmonary effort is normal. No respiratory distress.      Breath sounds: Normal breath sounds. No stridor. No wheezing or rales.   Abdominal:      General: Bowel sounds are normal. There is no distension.      Tenderness: There is abdominal tenderness. There is no guarding or rebound.      Comments: Generalized tenderness to palpation   Musculoskeletal:         General: No swelling, tenderness or deformity. Normal range of motion.   Skin:     General: Skin is warm and dry.      Coloration: Skin is not jaundiced.      Findings: No bruising or lesion.   Neurological:      General: No focal deficit present.      Mental Status: She is alert. She is disoriented and confused.      Cranial Nerves: No cranial nerve deficit.      Motor: No weakness.      Comments: Alert and oriented x 1 to self.  Moves all extremities.   Psychiatric:         Mood and Affect: Mood normal.       Labs Reviewed   POCT GLUCOSE - Abnormal       Result Value    POCT Glucose >600 (*)      No orders to display       ED Course & MDM   ED Course as of 02/15/25 2227   Sat Feb 15, 2025   1923 EKG on my read shows sinus tachycardia rate of 101 bpm, ST depressions in leads II, V4 through V6 but no ST elevations, QTc is 489 LA interval is 129.  Otherwise normal intervals. [CF]   1939 POCT Glucose, Venous(!!): >685 [CF]   1939 POCT Lactate, Venous(!!): 8.9 [CF]   1939 POCT pH, Venous(!): 7.26 [CF]   1942 Patient does appear to be in DKA. [CF]   1952 Beta-Hydroxybutyrate(!): 6.02 [CF]   1957 Anion Gap(!): 32 [CF]   1957 Creatinine(!): 2.97 [CF]   2009 LIPASE(!): 4,361 [CF]   2020 Coronavirus 2019, PCR(!): Detected [CF]   2128 IMPRESSION:  1. Mild bibasilar atelectasis   [CF]   2207 IMPRESSION:  No acute  intracranial abnormality.   [CF]   2211 IMPRESSION:  Limited by lack of IV contrast.      Disproportionate prominence of the pancreatic head. Fat stranding and  inflammatory changes small amount of fluid surrounding the pancreas.  Findings suggest acute pancreatitis. Correlate with clinical and  laboratory findings.      Fatty infiltrated liver.      Asymmetrically atrophic left kidney.       [CF]      ED Course User Index  [CF] Johanny Strauss MD         Diagnoses as of 02/15/25 2227   DKA, type 2, not at goal   Acute pancreatitis, unspecified complication status, unspecified pancreatitis type ([facility])   COVID       Medical Decision Making  This is a 84-year-old female who presents to the emergency department for altered mental status.  She alert and oriented to herself.  Speaking with her daughter it does sound that patient has been having viral illness for the past couple days.  They did not realize that she was not taking her medication as well.  She was also found to be COVID-positive here which suspect was a viral illness that started couple days ago.  She is altered here but is also found to be in DKA CT of her head showed no acute pathology no signs of pneumonia on her x-ray and she is not requiring any oxygen.  Her lipase was 4000 and CT of her abdomen does show acute pancreatitis there were no signs of obstruction.  CT of her head is also normal.  She does have diffuse ST depressions but no signs of a STEMI she does have troponins of 70s and 80s which I suspect is again due to the volume depletion from her DKA.  She was ultimately admitted and started on insulin.        Procedure  Critical Care    Performed by: Johanny Strauss MD  Authorized by: Johanny Strauss MD    Critical care provider statement:     Critical care time (minutes):  40    Critical care time was exclusive of:  Separately billable procedures and treating other patients    Critical care was necessary to treat or prevent  imminent or life-threatening deterioration of the following conditions:  Endocrine crisis    Critical care was time spent personally by me on the following activities:  Development of treatment plan with patient or surrogate, blood draw for specimens, discussions with consultants, evaluation of patient's response to treatment, ordering and performing treatments and interventions, ordering and review of radiographic studies, ordering and review of laboratory studies, pulse oximetry, re-evaluation of patient's condition, review of old charts and examination of patient    Care discussed with: admitting provider         Johanny Strauss MD  02/15/25 [id number]

## 2025-02-16 NOTE — CONSULTS
"Inpatient consult to Gastroenterology  Consult performed by: Mark Ching DO  Consult ordered by: Bridger Denton DO        [facility] Gastroenterology Consultation Note    ASSESSMENT and PLAN:       Марина Lozano is a 84 y.o. female with a significant past medical history of non-insulin dependent diabetes mellitus type 2, hypertension, hyperlipidemia history of throat cancer who presented with altered mental status. GI was consulted for \" acute pancreatitis concern for coffee-ground emesis\".       Coffee-ground emesis  -Patient with large amount of coffee-ground emesis leading to intubation this a.m.  -Hemoglobin stable at 12.6 this a.m. expected trend down to patient's baseline of 10.5 with aggressive IV fluid hydration due to underlying HHS and pancreatitis  -No overt signs of GI bleed on exam material in the NG tube appears to be coffee-ground's and bilious in nature no blood or blood clots present  -Would recommend continue with Protonix 40 mg IV twice daily  -Continue with every 6 hour hemoglobin to trend  -Continue with supportive care no plans for EGD at this time      2. Acute pancreatitis  -Lipase severely elevated along with CT showing typical findings of pancreatitis however CT is without IV contrast  -Etiology, possibly due to underlying COVID, no signs of choledocholithiasis nonelevated bilirubin, triglycerides not greater than 500 per family no history of alcohol abuse, someone of this age I was concerned about possible early pancreatic cancer however unable to obtain imaging with IV contrast consider repeat imaging if patient's renal function returns to baseline  -Continue with IV fluid hydration  -Pain control per admitting      3.  Acute renal failure/HHS  -Per acute care nephrology            Mark Ching DO   Gastroenterology         HISTORY OF PRESENT ILLNESS:     History Of Present Illness:      Марина Lozano is a 84 y.o. female with a significant past medical history of " "non-insulin dependent diabetes mellitus type 2, hypertension, hyperlipidemia history of throat cancer who presented with altered mental status. GI was consulted for \" acute pancreatitis concern for coffee-ground emesis\".     Patient was intubated this a.m. for coffee-ground emesis.  History is obtained from extensive chart review.  Per H&P patient lives alone and was found to be confused yesterday.  She had decreased appetite and was not eating very much.  She found to be laying half in a condition when in bed.  EMS was called patient is taken to the ER for further evaluation.  In the ER patient was found to have HHS with glucose in the thousands.  CT abdomen pelvis without IV contrast showed this appropriate prominence of the pancreatic head, fat stranding inflammatory injury is concerning for possible underlying pancreatitis.  Patient was started on insulin infusion admitted to the ICU for ongoing care.  This a.m. patient had 1 bout of what sounds like coffee-ground emesis due to her altered medical status patient was intubated.  Patient was also found to have COVID-19.    Patient was found to have emesis this morning that was coffee-ground in nature and required intubation to protect her airway.    Endoscopic History     None in epic    Review of systems:     Unable to perform patient intubated and sedated  Objective         PAST HISTORIES:       Past Medical History:  She has a past medical history of Encounter for immunization (03/24/2022) and Throat cancer (Multi) (04/17/2023).    Past Surgical History:  She has a past surgical history that includes Other surgical history (11/19/2019); Other surgical history (11/19/2019); and Other surgical history (11/19/2019).      Social History:  She reports that she has quit smoking. Her smoking use included cigarettes. She has never used smokeless tobacco. She reports that she does not currently use alcohol. She reports that she does not use drugs.    Family History:  No " "known GI disease, specifically denies pancreatitis, Crohn's, colon cancer, gastroesophageal cancer, or ulcerative colitis.    Family History   Problem Relation Name Age of Onset    Coronary artery disease Father          Allergies:  Codeine      OBJECTIVE:       Last Recorded Vitals:  Vitals:    02/16/25 0700 02/16/25 0800 02/16/25 0900 02/16/25 1000   BP: 118/69 116/67 98/65    BP Location:       Patient Position:       Pulse: 101 90 96 87   Resp: 25 (!) 57 (!) 37 13   Temp: 36.4 °C (97.5 °F)      TempSrc: Temporal      SpO2: 97% 95% 96% (!) 82%   Weight:       Height:         BP 98/65   Pulse 87   Temp 36.4 °C (97.5 °F) (Temporal)   Resp 13   Ht 1.676 m (5' 6\")   Wt 69.7 kg (153 lb 10.6 oz)   SpO2 (!) 82%   BMI 24.80 kg/m²      Physical Exam:    Physical Exam  Vitals reviewed.   Constitutional:       General: She is awake. She is not in acute distress.     Appearance: She is not ill-appearing.      Comments: Intubated and sedated   HENT:      Head: Normocephalic.      Mouth/Throat:      Mouth: Mucous membranes are moist.      Comments: NG tube in place there is bilious and coffee-ground like material in the canister no da blood or blood clots is seen in the oropharynx or in the NG tube  Eyes:      Extraocular Movements: Extraocular movements intact.   Cardiovascular:      Rate and Rhythm: Tachycardia present.      Heart sounds: Normal heart sounds.   Pulmonary:      Effort: Pulmonary effort is normal.      Breath sounds: Normal breath sounds.      Comments: Mechanical breath sounds heard  Abdominal:      General: Bowel sounds are normal. There is distension.      Palpations: Abdomen is soft.      Tenderness: There is no rebound.      Hernia: No hernia is present.   Musculoskeletal:         General: Normal range of motion.      Cervical back: Neck supple.   Skin:     General: Skin is warm and dry.   Neurological:      Comments: Unable to evaluate patient intubated and sedated   Psychiatric:         " Attention and Perception: Attention and perception normal.             Inpatient Medications:  cefTRIAXone, 1 g, intravenous, q24h  heparin (porcine), 5,000 Units, subcutaneous, q8h NISHANT  pantoprazole, 40 mg, oral, Daily before breakfast   Or  pantoprazole, 40 mg, intravenous, Daily before breakfast  [Held by provider] pravastatin, 20 mg, oral, Daily      PRN medications: bisacodyl, dextrose 10 % in water (D10W), dextrose 10 % in water (D10W), dextrose 10 % in water (D10W), dextrose, guaiFENesin, insulin regular, melatonin, ondansetron **OR** ondansetron    Outpatient Medications:  Prior to Admission medications    Medication Sig Start Date End Date Taking? Authorizing Provider   amLODIPine-benazepriL (LotreL) 10-20 mg capsule Take 1 capsule by mouth once daily. 11/22/24   ARTUR Chu   cholecalciferol (Vitamin D-3) 50 MCG (2000 UT) tablet Take 1 tablet (2,000 Units) by mouth every other day.    Historical Provider, MD   glipiZIDE (Glucotrol) 5 mg tablet Take 1 tablet (5 mg) by mouth 2 times a day. 10/24/24 4/22/25  Андрей Hsu MD   hydroCHLOROthiazide (Microzide) 12.5 mg tablet Take 1 tablet (12.5 mg) by mouth once daily. 11/22/24   ARTUR Chu   metFORMIN (Glucophage) 500 mg tablet Take 1 tablet (500 mg) by mouth 2 times daily (morning and late afternoon). 10/24/24 4/22/25  Андрей Hsu MD   multivitamin-Ca-iron-minerals (Tab-A-Verna Womens) 27-0.4 mg tablet Take 1 tablet by mouth once daily. 4/28/21   Historical Provider, MD   omeprazole (PriLOSEC) 20 mg DR capsule Take 1 capsule (20 mg) by mouth once daily in the morning. Take before meals. DO NOT CUSH OR CHEW 11/22/24   ARTUR Chu   OneTouch Delica Plus Lancet 33 gauge misc Inject 1 each into the skin 2 times a day. 10/24/24 10/24/25  Андрей Hsu MD   OneTouch Verio Flex meter misc CHECK SUGARS THREE TIMES DAILY 6/13/22   Historical Provider, MD   OneTouch Verio test strips  "strip Inject 1 each into the skin 2 times a day. 10/24/24 10/24/25  Андрей Hsu MD   pioglitazone (Actos) 15 mg tablet Take 1 tablet (15 mg) by mouth once daily. 10/24/24 4/22/25  Андрей Hsu MD   pravastatin (Pravachol) 20 mg tablet Take 1 tablet (20 mg) by mouth once daily. 11/22/24   BRIE Chu-CNP   vitamin E acid succinate (vitamin E succinate) 67 mg (100 unit) tablet Take by mouth. 12/7/22   Historical Provider, MD       LABS AND IMAGING:     Last Labs:    Recent labs reviewed in the EMR.    Lab Results   Component Value Date    WBC 12.9 (H) 02/16/2025    HGB 12.9 02/16/2025    HGB 15.7 02/16/2025    HGB 16.9 (H) 02/15/2025    MCV 84 02/16/2025     (L) 02/16/2025     02/15/2025       Lab Results   Component Value Date     02/16/2025    K 3.9 02/16/2025    CL 96 (L) 02/16/2025    BUN 56 (H) 02/16/2025    CREATININE 3.36 (H) 02/16/2025    CREATININE 3.29 (H) 02/16/2025       Lab Results   Component Value Date    BILITOT 0.6 02/16/2025    BILITOT 0.8 02/15/2025    BILIDIR 0.2 02/16/2025    ALKPHOS 113 02/16/2025    ALKPHOS 140 (H) 02/15/2025    AST 54 (H) 02/16/2025    AST 36 02/15/2025    ALT 43 02/16/2025    ALT 48 (H) 02/15/2025    LIPASE 4,361 (H) 02/15/2025       No results found for: \"CRP\", \"CALPS\"  No results found for: \"CALPS\"      Imaging Results     CT abdomen pelvis wo IV contrast    Result Date: 2/15/2025  Interpreted By:  Dannie Soto, STUDY: CT ABDOMEN PELVIS WO IV CONTRAST;  2/15/2025 8:58 pm   INDICATION: Signs/Symptoms:abdomional pain, diffuse, lipase is 4K.     COMPARISON: None   ACCESSION NUMBER(S): [id number]   ORDERING CLINICIAN: DAMON JOSE   TECHNIQUE: CT of the abdomen and pelvis was performed. Contiguous axial images were obtained at 3 mm slice thickness through the abdomen and pelvis. Coronal and sagittal reconstructions at 3 mm slice thickness were performed.  No intravenous contrast was administered.   FINDINGS: " Please note that the evaluation of vessels, lymph nodes and organs is limited without intravenous contrast.   LOWER CHEST: The included lung bases demonstrate hyperinflation. Aortic calcification. Small hiatal hernia. Coronary artery calcification.   ABDOMEN:   LIVER: The liver is diminished in attenuation suggesting fatty infiltration. No definite focal liver lesions within the limits of this unenhanced study.   BILE DUCTS: No bile duct dilatation.   GALLBLADDER: No definite gallstones. No gallbladder wall thickening.   PANCREAS: There is enlargement of the pancreatic head with surrounding fat stranding and inflammatory changes. There is fat stranding surrounding the entire pancreas but especially the pancreatic head and tail. Findings suggest likely acute pancreatitis.   SPLEEN: Unremarkable spleen.   ADRENAL GLANDS: No adrenal masses.   KIDNEYS AND URETERS: Unremarkable right kidney. Atrophic left kidney. No hydronephrosis. No hydroureter.   PELVIS:   BLADDER: Suboptimally distended and evaluated.   REPRODUCTIVE ORGANS: No definite masses.   BOWEL: No evidence of bowel obstruction. Diverticulosis without definite diverticulitis. Unremarkable appendix.   VESSELS: Aortic calcification. No aneurysmal dilatation. Calcification at the origin of both renal arteries.   PERITONEUM/RETROPERITONEUM/LYMPH NODES: No retroperitoneal adenopathy. No ascites.   ABDOMINAL WALL: Tiny fat containing umbilical hernia.   BONES: Discogenic degenerative changes. Vacuum disc at multiple levels.       Limited by lack of IV contrast.   Disproportionate prominence of the pancreatic head. Fat stranding and inflammatory changes small amount of fluid surrounding the pancreas. Findings suggest acute pancreatitis. Correlate with clinical and laboratory findings.   Fatty infiltrated liver.   Asymmetrically atrophic left kidney.   MACRO: None   Signed by: Dannie Soto 2/15/2025 10:08 PM Dictation workstation:   [id number]    CT head wo IV  contrast    Result Date: 2/15/2025  Interpreted By:  Dominik Covington, STUDY: CT HEAD WO IV CONTRAST;  2/15/2025 8:58 pm   INDICATION: Signs/Symptoms:AMS.   COMPARISON: None.   ACCESSION NUMBER(S): [id number]   ORDERING CLINICIAN: DAMON JOSE   TECHNIQUE: Noncontrast axial CT images of head were obtained with coronal and sagittal reconstructed images.   FINDINGS: BRAIN PARENCHYMA: Mild-to-moderate cortical atrophy. No acute intraparenchymal hemorrhage or parenchymal evidence of acute large territory ischemic infarct. No mass-effect. Gray-white matter distinction is preserved.   VENTRICLES and EXTRA-AXIAL SPACES:  No acute extra-axial or intraventricular hemorrhage. No effacement of cerebral sulci. Ventricles and sulci are age-concordant.   PARANASAL SINUSES/MASTOIDS:  No hemorrhage or air-fluid levels within the visualized paranasal sinuses. Postsurgical change with resection of left mastoid air cells and the left middle ear structures.   CALVARIUM/ORBITS:  No skull fracture. The orbits and globes are intact to the extent visualized.   EXTRACRANIAL SOFT TISSUES: No discernible abnormality.       No acute intracranial abnormality.     MACRO: None.   Signed by: Dominik Covington 2/15/2025 10:00 PM Dictation workstation:   [id number]    XR chest 2 views    Result Date: 2/15/2025  Interpreted By:  Juan Lloyd, STUDY: XR CHEST 2 VIEWS;  2/15/2025 8:35 pm   INDICATION: Signs/Symptoms:cough.     COMPARISON: None.   ACCESSION NUMBER(S): [id number]   ORDERING CLINICIAN: DAMON JOSE   FINDINGS:         CARDIOMEDIASTINAL SILHOUETTE: Cardiomediastinal silhouette is normal in size and configuration.   LUNGS: There is mild bibasilar atelectasis. There is no consolidation or effusion   ABDOMEN: No remarkable upper abdominal findings.   BONES: No acute osseous changes.       1. Mild bibasilar atelectasis       MACRO: None   Signed by: Juan Lloyd 2/15/2025 8:44 PM Dictation workstation:   [id number]

## 2025-02-16 NOTE — H&P
[facility] - GENERAL MEDICINE HISTORY AND PHYSICAL    History Obtained From (Primary Source): Daughter present in room  Collateral History (Secondary Sources): D/w ED physician    History Of Present Illness (HPI):  Марина Lozano is a 84 y.o. female with PMHx s/f nonID DM2, (diagnosed 2005), HTN, HLD, throat cancer (2007) presenting with HHS and pancreatitis.  Patient is very altered on interview, so history gathered from daughter present in the room.  Daughter states herself and her sister been taking care of the patient over the past week.  She lives alone and initially she was having upper respiratory symptoms including fatigue, cough and congestion.  No sputum production, fever or chills noted.  They did note that the patient has not been eating very much.  As of yesterday her symptoms were mostly stable and were not getting worse.  When the sister checked on the patient this evening, she found her to be acutely confused with garbled speech and she was lying half naked and disheveled in bed.  At that point EMS was called and the patient was taken to the ER.  The family does note that she may not have been taking her meds as prescribed recently.  She has had a general decline over the past few months family is noted that she seemed more forgetful and was not paying her bills.  She otherwise is relatively healthy up until this past week.  Patient on interview is alert only to self, she seems to think she is still in her apartment.  She is not oriented to place or time.  No current abdominal tenderness, nausea, vomiting or diarrhea noted. She is a former smoker and does not use alcohol or illicit drugs.    ED Course (Summary - please note all labs, imaging studies, and interventions noted below have been personally reviewed and/or interpreted on day of admission):   Vitals on presentation: 96.8 F, 101 bpm, 23 rr, 97/69 -> 147/78, 94% on RA  Labs: CMP glu 1248, Na 134, K 3.4, Cl 85, bicarb 20, anion  gap 32, BUN 53, Cr 2.97, alk phos 140, ALT 48  Mag 3.29    Lipase 4361  Trop 78 -> 87  Beta-hydroxybutyrate 6.02  CBC WBC 12.8, hg 16.9, absolute neutrophils 11.65  Flu negative, COVID PCR positive  VBG pH 7.26, lactate 8.9, pCO2 53  EKG: pending  Imaging: CXR - mild bibasilar atelectasis  CT a/p wout contrast - Disproportionate prominence of the pancreatic head. Fat stranding and   inflammatory changes small amount of fluid surrounding the pancreas.   Findings suggest acute pancreatitis. Correlate with clinical and   laboratory findings.   Fatty infiltrated liver.   Asymmetrically atrophic left kidney.   CT head wout contrast - No acute intracranial abnormality.   Interventions: Insulin drip started, LR 2 L bolus, K-run started. Pt admitted for further care.    12-point ROS reviewed and found to be negative aside from aforementioned positives in HPI and/or noted in dedicated ROS section below.     ED Course (From ED Provider):  ED Course as of 02/15/25 2303   Sat Feb 15, 2025   1923 EKG on my read shows sinus tachycardia rate of 101 bpm, ST depressions in leads II, V4 through V6 but no ST elevations, QTc is 489 [place] interval is 129.  Otherwise normal intervals. [CF]   1939 POCT Glucose, Venous(!!): >685 [CF]   1939 POCT Lactate, Venous(!!): 8.9 [CF]   1939 POCT pH, Venous(!): 7.26 [CF]   1942 Patient does appear to be in DKA. [CF]   1952 Beta-Hydroxybutyrate(!): 6.02 [CF]   1957 Anion Gap(!): 32 [CF]   1957 Creatinine(!): 2.97 [CF]   2009 LIPASE(!): 4,361 [CF]   2020 Coronavirus 2019, PCR(!): Detected [CF]   2128 IMPRESSION:  1. Mild bibasilar atelectasis   [CF]   2207 IMPRESSION:  No acute intracranial abnormality.   [CF]   2211 IMPRESSION:  Limited by lack of IV contrast.      Disproportionate prominence of the pancreatic head. Fat stranding and  inflammatory changes small amount of fluid surrounding the pancreas.  Findings suggest acute pancreatitis. Correlate with clinical and  laboratory findings.       Fatty infiltrated liver.      Asymmetrically atrophic left kidney.       [CF]      ED Course User Index  [CF] Johanny Strauss MD         Diagnoses as of 02/15/25 2303   DKA, type 2, not at goal   Acute pancreatitis, unspecified complication status, unspecified pancreatitis type (HHS-HCC)   COVID     Relevant Results  Results for orders placed or performed during the hospital encounter of 02/15/25 (from the past 24 hours)   POCT GLUCOSE   Result Value Ref Range    POCT Glucose >600 (H) 74 - 99 mg/dL   Beta Hydroxybutyrate   Result Value Ref Range    Beta-Hydroxybutyrate 6.02 (H) 0.02 - 0.27 mmol/L   CBC and Auto Differential   Result Value Ref Range    WBC 12.8 (H) 4.4 - 11.3 x10*3/uL    nRBC 0.0 0.0 - 0.0 /100 WBCs    RBC 6.46 (H) 4.00 - 5.20 x10*6/uL    Hemoglobin 16.9 (H) 12.0 - 16.0 g/dL    Hematocrit 54.0 (H) 36.0 - 46.0 %    MCV 84 80 - 100 fL    MCH 26.2 26.0 - 34.0 pg    MCHC 31.3 (L) 32.0 - 36.0 g/dL    RDW 15.4 (H) 11.5 - 14.5 %    Platelets 234 150 - 450 x10*3/uL    Neutrophils % 90.7 40.0 - 80.0 %    Immature Granulocytes %, Automated 0.3 0.0 - 0.9 %    Lymphocytes % 4.9 13.0 - 44.0 %    Monocytes % 3.9 2.0 - 10.0 %    Eosinophils % 0.0 0.0 - 6.0 %    Basophils % 0.2 0.0 - 2.0 %    Neutrophils Absolute 11.65 (H) 1.60 - 5.50 x10*3/uL    Immature Granulocytes Absolute, Automated 0.04 0.00 - 0.50 x10*3/uL    Lymphocytes Absolute 0.63 (L) 0.80 - 3.00 x10*3/uL    Monocytes Absolute 0.50 0.05 - 0.80 x10*3/uL    Eosinophils Absolute 0.00 0.00 - 0.40 x10*3/uL    Basophils Absolute 0.02 0.00 - 0.10 x10*3/uL   Magnesium   Result Value Ref Range    Magnesium 3.29 (H) 1.60 - 2.40 mg/dL   Comprehensive metabolic panel   Result Value Ref Range    Glucose 1,248 (HH) 74 - 99 mg/dL    Sodium 134 (L) 136 - 145 mmol/L    Potassium 3.4 (L) 3.5 - 5.3 mmol/L    Chloride 85 (L) 98 - 107 mmol/L    Bicarbonate 20 (L) 21 - 32 mmol/L    Anion Gap 32 (H) 10 - 20 mmol/L    Urea Nitrogen 53 (H) 6 - 23 mg/dL    Creatinine 2.97  (H) 0.50 - 1.05 mg/dL    eGFR 15 (L) >60 mL/min/1.73m*2    Calcium 10.1 8.6 - 10.3 mg/dL    Albumin 4.0 3.4 - 5.0 g/dL    Alkaline Phosphatase 140 (H) 33 - 136 U/L    Total Protein 8.4 (H) 6.4 - 8.2 g/dL    AST 36 9 - 39 U/L    Bilirubin, Total 0.8 0.0 - 1.2 mg/dL    ALT 48 (H) 7 - 45 U/L   Lipase   Result Value Ref Range    Lipase 4,361 (H) 9 - 82 U/L   Troponin I, High Sensitivity   Result Value Ref Range    Troponin I, High Sensitivity 78 (HH) 0 - 13 ng/L   Blood Gas Venous Full Panel   Result Value Ref Range    POCT pH, Venous 7.26 (L) 7.33 - 7.43 pH    POCT pCO2, Venous 53 (H) 41 - 51 mm Hg    POCT pO2, Venous 36 35 - 45 mm Hg    POCT SO2, Venous 52 45 - 75 %    POCT Oxy Hemoglobin, Venous 51.6 45.0 - 75.0 %    POCT Hematocrit Calculated, Venous 53.0 (H) 36.0 - 46.0 %    POCT Sodium, Venous 132 (L) 136 - 145 mmol/L    POCT Potassium, Venous 3.7 3.5 - 5.3 mmol/L    POCT Chloride, Venous 88 (L) 98 - 107 mmol/L    POCT Ionized Calicum, Venous 1.23 1.10 - 1.33 mmol/L    POCT Glucose, Venous >685 (HH) 74 - 99 mg/dL    POCT Lactate, Venous 8.9 (HH) 0.4 - 2.0 mmol/L    POCT Base Excess, Venous -4.2 (L) -2.0 - 3.0 mmol/L    POCT HCO3 Calculated, Venous 23.8 22.0 - 26.0 mmol/L    POCT Hemoglobin, Venous 17.6 (H) 12.0 - 16.0 g/dL    POCT Anion Gap, Venous 24.0 10.0 - 25.0 mmol/L    Patient Temperature 37.0 degrees Celsius    FiO2 21 %   Sars-CoV-2 PCR   Result Value Ref Range    Coronavirus 2019, PCR Detected (A) Not Detected   Influenza A, and B PCR   Result Value Ref Range    Flu A Result Not Detected Not Detected    Flu B Result Not Detected Not Detected   Lipid Panel Non-Fasting   Result Value Ref Range    Cholesterol 199 0 - 199 mg/dL    HDL-Cholesterol 45.4 mg/dL    Cholesterol/HDL Ratio 4.4     Non-HDL Cholesterol 154 (H) 0 - 149 mg/dL   Troponin I, High Sensitivity   Result Value Ref Range    Troponin I, High Sensitivity 87 (HH) 0 - 13 ng/L   B-Type Natriuretic Peptide   Result Value Ref Range     (H) 0 -  99 pg/mL      CT abdomen pelvis wo IV contrast    Result Date: 2/15/2025  Interpreted By:  Dannie Soto, STUDY: CT ABDOMEN PELVIS WO IV CONTRAST;  2/15/2025 8:58 pm   INDICATION: Signs/Symptoms:abdomional pain, diffuse, lipase is 4K.     COMPARISON: None   ACCESSION NUMBER(S): [id number]   ORDERING CLINICIAN: DAMON JOSE   TECHNIQUE: CT of the abdomen and pelvis was performed. Contiguous axial images were obtained at 3 mm slice thickness through the abdomen and pelvis. Coronal and sagittal reconstructions at 3 mm slice thickness were performed.  No intravenous contrast was administered.   FINDINGS: Please note that the evaluation of vessels, lymph nodes and organs is limited without intravenous contrast.   LOWER CHEST: The included lung bases demonstrate hyperinflation. Aortic calcification. Small hiatal hernia. Coronary artery calcification.   ABDOMEN:   LIVER: The liver is diminished in attenuation suggesting fatty infiltration. No definite focal liver lesions within the limits of this unenhanced study.   BILE DUCTS: No bile duct dilatation.   GALLBLADDER: No definite gallstones. No gallbladder wall thickening.   PANCREAS: There is enlargement of the pancreatic head with surrounding fat stranding and inflammatory changes. There is fat stranding surrounding the entire pancreas but especially the pancreatic head and tail. Findings suggest likely acute pancreatitis.   SPLEEN: Unremarkable spleen.   ADRENAL GLANDS: No adrenal masses.   KIDNEYS AND URETERS: Unremarkable right kidney. Atrophic left kidney. No hydronephrosis. No hydroureter.   PELVIS:   BLADDER: Suboptimally distended and evaluated.   REPRODUCTIVE ORGANS: No definite masses.   BOWEL: No evidence of bowel obstruction. Diverticulosis without definite diverticulitis. Unremarkable appendix.   VESSELS: Aortic calcification. No aneurysmal dilatation. Calcification at the origin of both renal arteries.   PERITONEUM/RETROPERITONEUM/LYMPH NODES: No  retroperitoneal adenopathy. No ascites.   ABDOMINAL WALL: Tiny fat containing umbilical hernia.   BONES: Discogenic degenerative changes. Vacuum disc at multiple levels.       Limited by lack of IV contrast.   Disproportionate prominence of the pancreatic head. Fat stranding and inflammatory changes small amount of fluid surrounding the pancreas. Findings suggest acute pancreatitis. Correlate with clinical and laboratory findings.   Fatty infiltrated liver.   Asymmetrically atrophic left kidney.   MACRO: None   Signed by: Dannie Soto 2/15/2025 10:08 PM Dictation workstation:   [id number]    CT head wo IV contrast    Result Date: 2/15/2025  Interpreted By:  Dominik Covington, STUDY: CT HEAD WO IV CONTRAST;  2/15/2025 8:58 pm   INDICATION: Signs/Symptoms:AMS.   COMPARISON: None.   ACCESSION NUMBER(S): [id number]   ORDERING CLINICIAN: DAMON JOSE   TECHNIQUE: Noncontrast axial CT images of head were obtained with coronal and sagittal reconstructed images.   FINDINGS: BRAIN PARENCHYMA: Mild-to-moderate cortical atrophy. No acute intraparenchymal hemorrhage or parenchymal evidence of acute large territory ischemic infarct. No mass-effect. Gray-white matter distinction is preserved.   VENTRICLES and EXTRA-AXIAL SPACES:  No acute extra-axial or intraventricular hemorrhage. No effacement of cerebral sulci. Ventricles and sulci are age-concordant.   PARANASAL SINUSES/MASTOIDS:  No hemorrhage or air-fluid levels within the visualized paranasal sinuses. Postsurgical change with resection of left mastoid air cells and the left middle ear structures.   CALVARIUM/ORBITS:  No skull fracture. The orbits and globes are intact to the extent visualized.   EXTRACRANIAL SOFT TISSUES: No discernible abnormality.       No acute intracranial abnormality.     MACRO: None.   Signed by: Dominik Covington 2/15/2025 10:00 PM Dictation workstation:   [id number]    XR chest 2 views    Result Date: 2/15/2025  Interpreted By:  Juan Lloyd,  STUDY: XR CHEST 2 VIEWS;  2/15/2025 8:35 pm   INDICATION: Signs/Symptoms:cough.     COMPARISON: None.   ACCESSION NUMBER(S): [id number]   ORDERING CLINICIAN: DAMON JOSE   FINDINGS:         CARDIOMEDIASTINAL SILHOUETTE: Cardiomediastinal silhouette is normal in size and configuration.   LUNGS: There is mild bibasilar atelectasis. There is no consolidation or effusion   ABDOMEN: No remarkable upper abdominal findings.   BONES: No acute osseous changes.       1. Mild bibasilar atelectasis       MACRO: None   Signed by: Juan Lloyd 2/15/2025 8:44 PM Dictation workstation:   [id number]    Scheduled medications:  heparin (porcine), 5,000 Units, subcutaneous, q8h NISHANT  [START ON 2/16/2025] pantoprazole, 40 mg, oral, Daily before breakfast   Or  [START ON 2/16/2025] pantoprazole, 40 mg, intravenous, Daily before breakfast  [START ON 2/16/2025] pravastatin, 20 mg, oral, Daily      Continuous medications:  D5 % and 0.9 % sodium chloride, 150 mL/hr  dextrose 10 % in water (D10W), 150 mL/hr  dextrose 10 % in water (D10W), 150 mL/hr  dextrose 10 % in water (D10W), 150 mL/hr  insulin regular, 0-50 Units/hr, Last Rate: 3.6 Units/hr (02/15/25 2158)  insulin regular, 0-50 Units/hr  sodium chloride, 250 mL/hr, Last Rate: 250 mL/hr (02/15/25 2225)  sodium chloride, 250 mL/hr      PRN medications:  PRN medications: bisacodyl, D5 % and 0.9 % sodium chloride, dextrose 10 % in water (D10W), dextrose 10 % in water (D10W), dextrose 10 % in water (D10W), dextrose, guaiFENesin, insulin regular, melatonin, ondansetron **OR** ondansetron     Past Medical History  She has a past medical history of Encounter for immunization (03/24/2022) and Throat cancer (Multi) (04/17/2023).    Surgical History  She has a past surgical history that includes Other surgical history (11/19/2019); Other surgical history (11/19/2019); and Other surgical history (11/19/2019).     Social History  She reports that she has quit smoking. Her smoking use  included cigarettes. She has never used smokeless tobacco. She reports that she does not currently use alcohol. She reports that she does not use drugs.    Family History  Family History   Problem Relation Name Age of Onset    Coronary artery disease Father         Allergies  Codeine    Code Status  Full Code     Review of Systems   Constitutional:  Positive for activity change, appetite change and fatigue. Negative for chills, diaphoresis and fever.   HENT:  Negative for congestion, ear pain, rhinorrhea, sinus pain and sore throat.    Respiratory:  Positive for cough and shortness of breath. Negative for apnea, chest tightness, wheezing and stridor.    Cardiovascular:  Negative for chest pain, palpitations and leg swelling.   Gastrointestinal:  Negative for abdominal distention, abdominal pain, constipation, diarrhea, nausea and vomiting.   Genitourinary:  Negative for difficulty urinating, dysuria, flank pain, frequency, hematuria and urgency.   Musculoskeletal:  Negative for arthralgias, back pain, gait problem, joint swelling and myalgias.   Skin:  Negative for color change, pallor, rash and wound.   Neurological:  Negative for dizziness, syncope, weakness, light-headedness, numbness and headaches.   Psychiatric/Behavioral:  Negative for agitation, behavioral problems, confusion and decreased concentration. The patient is not nervous/anxious.    All other systems reviewed and are negative.      Last Recorded Vitals  /78 (BP Location: Left arm, Patient Position: Lying)   Pulse 100   Temp 36 °C (96.8 °F) (Temporal)   Resp (!) 23   Wt 71.6 kg (157 lb 13.6 oz)   SpO2 96%      Physical Exam:  Vital signs and nursing notes reviewed.   Constitutional: Pleasant and cooperative. Laying in bed in moderate acute distress. Conversant.   Skin: Warm and dry; no obvious lesions, rashes, pallor, or jaundice.   Eyes: EOMI. Anicteric sclera.   ENT: Mucous membranes dry, no obvious injury or deformity appreciated.    Head and Neck: Normocephalic, atraumatic. ROM preserved. Trachea midline. No appreciable JVD.   Respiratory: Nonlabored on RA. Lungs clear to auscultation bilaterally without obvious adventitious sounds. Chest rise is equal.  Cardiovascular: regular tachycardia No gross murmur, gallop, or rub. Extremities are warm and well-perfused with good capillary refill (< 3 seconds). No chest wall tenderness.   GI: Abdomen soft, nontender, nondistended. No obvious organomegaly appreciated. Bowel sounds are present.  : No CVA tenderness.   MSK: No gross abnormalities appreciated. No limitations to AROM/PROM appreciated.   Extremities: No cyanosis, edema, or clubbing evident. Neurovascularly intact.   Neuro: A&Ox1. CN 2-12 grossly intact. No acute focal neurologic deficits appreciated. Hard of hearing, not responding to questioning appropriately.  Psych: Appropriate mood and behavior.    Assessment/Plan     84 y.o. female with PMHx s/f nonID DM2, (diagnosed 2005), HTN, HLD, throat cancer (2007) presenting with HHS and pancreatitis.    Plan:  Admit to ICU  Consult intensivist.    HHS/hyperglycemia/DM2/dehydration  Calculated osm - 356, glucose 1248, beta-hydroxybutyrate 6.02, anion gap 32.  Insulin drip started per order set.  On NS 0.45 n/s currently at 250 ml/hr, fluids per order sets  BMPS/lactate/Mag/Phos q4h  POCT glucose q1h  Consult endocrinology    Pancreatitis:  Lipase 4361, CT imaging showing evidence of pancreatitis  Likely related to associated HHS  NPO  No currently abdominal pain on exam  Consult GI    Acute COVID-19:  Likely the initial insult of her acute medical issues.  Currently on room air with no respiratory complaints, so no treatment indicated for this currently. Will continue to monitor.    JAKY on CKD:  Serum Cr 2.97, baseline ~1.4.  Fluids currently being administered.  Likely related to HHS. If does not improve, consider renal consult.    Elevated troponins:  Trops 78 -> 87, no current ACS  symptoms.  Will continue to monitor for now.    Diet: NPO  DVT Prophylaxis: Heparin SQ  Code Status: Discussed code status with daughter present in room. Pt will be full code for now, but indicated that they will likely seek to changer her to a DNR/DNI once she discusses things with her sister. Follow-up on this later during stay.  Case Discussed With: ED provider  Additional Sources Reviewed: ED note day of admission; past endocrinology notes    Anticipated Length of Stay (LOS): Patient will require 2+ midnights for workup and treatment of HHS, pancreatitis, an COVID-19.    60 minutes critical care time spent on this pt encounter.     DO Ana Virk dictation software was used to dictate this note and thus there may be minor errors in translation/transcription including garbled speech or misspellings. Please contact for clarification if needed.

## 2025-02-16 NOTE — CONSULTS
Infectious Disease Inpatient Consult    Inpatient consult to Infectious Diseases  Consult performed by: Cricket Johnson MD  Consult ordered by: Sarah Turner MD          Primary MD: BRIE Chu-CNP    Reason For Consult  COVID      Assessment/Plan:    #Acute hypoxic respiratory failure likely 2/2 aspiration  #S/p intubation for airway protection and hypoxia  #COVID-pneumonia  Chest x-ray did not show any significant bilateral infiltrates.  Patient likely aspirated during the coffee-ground emesis episode.  Patient also has acute kidney injury with creatinine clearance <30.  Do not think remdesivir or barcitinib will be helpful in this case.  UA did not show any significant pyuria    #Acute kidney injury  Estimated Creatinine Clearance: 11 mL/min (A) (by C-G formula based on SCr of 3.44 mg/dL (H)).    Shock, less likely septic: Likely due to DKA/HHS  Lactic acidosis    DM  HLD, HTN  Throat cancer in remission    Recommendations:    -Continue ceftriaxone 2 g every 24 hours  -Monitor blood cultures  -UA did not show any significant pyuria  -COVID isolation  -Does not qualify for remdesivir or barcitinib  -Monitor kidney function  -Thank you for consult.  Will continue to follow    Cricket Johnson MD  Date of service: 2/16/2025  Time of service: 1:16 PM      History Of Present Illness  Марина Lozano is a 84 y.o. female with PMHx of DM, HLD, HTN, throat cancer was brought into the hospital for confusion.  Daughter states that, patient has not been able to take care of herself from last 1 week, she has been having upper respiratory symptoms including cough, congestion.  Yesterday, the sister went to check on the patient and she was extremely confused and EMS was called and was brought to the hospital for further evaluation.  Daughter states that patient is completely independent and takes care of herself    On admission, afebrile, , RR 23, BP 97/69, 94% on room air.  Labs showed WBC count 12.8, Hb  16.9, platelet 234, potassium 3.4, creatinine 2.9, lipase 4361, glucose 1248.  ABG shows pH 7.2..  Tested positive for COVID.  Blood cultures and urine cultures were drawn and patient was started on ceftriaxone.    Patient was admitted to ICU for DKA.  Today morning, patient had coffee-ground emesis, aspirated, unable to protect his airway and was intubated.  ID consulted for further antibiotic recommendations      Past Medical History  She has a past medical history of Encounter for immunization (03/24/2022) and Throat cancer (Multi) (04/17/2023).    Surgical History  She has a past surgical history that includes Other surgical history (11/19/2019); Other surgical history (11/19/2019); and Other surgical history (11/19/2019).     Social History     Occupational History    Not on file   Tobacco Use    Smoking status: Former     Types: Cigarettes    Smokeless tobacco: Never   Vaping Use    Vaping status: Never Used   Substance and Sexual Activity    Alcohol use: Not Currently    Drug use: Never    Sexual activity: Not on file     Travel History   Travel since 01/16/25    No documented travel since 01/16/25                Family History  Family History   Problem Relation Name Age of Onset    Coronary artery disease Father       Allergies  Codeine     Immunization History   Administered Date(s) Administered    Flu vaccine, quadrivalent, high-dose, preservative free, age 65y+ (FLUZONE) 09/16/2020, 09/30/2021, 09/29/2022    Flu vaccine, trivalent, preservative free, HIGH-DOSE, age 65y+ (Fluzone) 11/03/2015, 11/07/2016, 10/23/2017, 11/08/2019    Flu vaccine, trivalent, preservative free, age 6 months and greater (Fluarix/Fluzone/Flulaval) 11/25/2024    Influenza, trivalent, adjuvanted 11/08/2018    Pneumococcal polysaccharide vaccine, 23-valent, age 2 years and older (PNEUMOVAX 23) 03/24/2022    Zoster, live 08/27/2015, 08/27/2015     Medications  Home medications:  Medications Prior to Admission   Medication Sig Dispense  Refill Last Dose/Taking    amLODIPine-benazepriL (LotreL) 10-20 mg capsule Take 1 capsule by mouth once daily. 90 capsule 1     cholecalciferol (Vitamin D-3) 50 MCG (2000 UT) tablet Take 1 tablet (2,000 Units) by mouth every other day.       glipiZIDE (Glucotrol) 5 mg tablet Take 1 tablet (5 mg) by mouth 2 times a day. 180 tablet 1     hydroCHLOROthiazide (Microzide) 12.5 mg tablet Take 1 tablet (12.5 mg) by mouth once daily. 90 tablet 1     metFORMIN (Glucophage) 500 mg tablet Take 1 tablet (500 mg) by mouth 2 times daily (morning and late afternoon). 180 tablet 1     multivitamin-Ca-iron-minerals (Tab-A-Verna Womens) 27-0.4 mg tablet Take 1 tablet by mouth once daily.       omeprazole (PriLOSEC) 20 mg DR capsule Take 1 capsule (20 mg) by mouth once daily in the morning. Take before meals. DO NOT CUSH OR CHEW 90 capsule 1     OneTouch Delica Plus Lancet 33 gauge misc Inject 1 each into the skin 2 times a day. 200 each 3     OneTouch Verio Flex meter misc CHECK SUGARS THREE TIMES DAILY       OneTouch Verio test strips strip Inject 1 each into the skin 2 times a day. 200 strip 3     pioglitazone (Actos) 15 mg tablet Take 1 tablet (15 mg) by mouth once daily. 90 tablet 1     pravastatin (Pravachol) 20 mg tablet Take 1 tablet (20 mg) by mouth once daily. 90 tablet 1     vitamin E acid succinate (vitamin E succinate) 67 mg (100 unit) tablet Take by mouth.        Current medications:  Scheduled medications  albumin human, 25 g, intravenous, Once  cefTRIAXone, 1 g, intravenous, q24h  heparin (porcine), 5,000 Units, subcutaneous, q8h NISHANT  oxygen, , inhalation, Continuous - Inhalation  pantoprazole, 40 mg, oral, Daily before breakfast   Or  pantoprazole, 40 mg, intravenous, Daily before breakfast  [Held by provider] pravastatin, 20 mg, oral, Daily      Continuous medications  dextrose 10 % in water (D10W), 150 mL/hr  dextrose 10 % in water (D10W), 150 mL/hr  dextrose 10 % in water (D10W), 150 mL/hr  fentaNYL, 0-200  "mcg/hr  insulin regular, 0-50 Units/hr, Last Rate: 7 Units/hr (25 1200)  norepinephrine, 0-1 mcg/kg/min, Last Rate: 0.001 mcg/kg/min (25 1330)  propofol, 0-20 mcg/kg/min, Last Rate: Stopped (25 1300)  potassium chloride-0.45 % NaCl, 150 mL/hr, Last Rate: 150 mL/hr (25 0359)      PRN medications  PRN medications: bisacodyl, dextrose 10 % in water (D10W), dextrose 10 % in water (D10W), dextrose 10 % in water (D10W), dextrose, guaiFENesin, insulin regular, melatonin, ondansetron **OR** ondansetron    Review of Systems     Unable to obtain    Objective  Range of Vitals (last 24 hours)  Heart Rate:  []   Temp:  [36 °C (96.8 °F)-36.4 °C (97.5 °F)]   Resp:  [13-57]   BP: ()/(60-89)   Height:  [167.6 cm (5' 6\")]   Weight:  [69.7 kg (153 lb 10.6 oz)-71.6 kg (157 lb 13.6 oz)]   SpO2:  [82 %-100 %]   Daily Weight  25 : 69.7 kg (153 lb 10.6 oz)    Body mass index is 24.8 kg/m².     Physical Exam  BP 91/60   Pulse (!) 123   Temp 36.4 °C (97.5 °F) (Temporal)   Resp 16   Ht 1.676 m (5' 6\")   Wt 69.7 kg (153 lb 10.6 oz)   SpO2 100%   BMI 24.80 kg/m²   Temp (24hrs), Av.1 °C (97 °F), Min:36 °C (96.8 °F), Max:36.4 °C (97.5 °F)      General: Intubated sedated  HEENT: No conjunctival pallor, no scleral icterus  Lungs: bilaterally clear to auscultation, no wheezing  Heart: Tachycardic  Abdomen: soft,  non distended  Neuro: AAO x 0  Extremities: no edema  Skin: No ulcers  MSK: No joint inflammation     Relevant Results    Labs  Results from last 72 hours   Lab Units 25  1031 25  0548 02/15/25  1925   WBC AUTO x10*3/uL  --  12.9* 12.8*   HEMOGLOBIN g/dL 12.9 15.7 16.9*   HEMATOCRIT % 40.8 49.3* 54.0*   PLATELETS AUTO x10*3/uL  --  131* 234   NEUTROS PCT AUTO %  --  90.8 90.7   LYMPHS PCT AUTO %  --  5.2 4.9   MONOS PCT AUTO %  --  1.8 3.9   EOS PCT AUTO %  --  1.4 0.0     Results from last 72 hours   Lab Units 25  1200 25  1003 25  0856   SODIUM mmol/L 139 " "138 136   POTASSIUM mmol/L 4.3 3.9 4.2   CHLORIDE mmol/L 99 96* 96*   CO2 mmol/L 22 22 17*   BUN mg/dL 56* 56* 56*   CREATININE mg/dL 3.44* 3.36* 3.29*   GLUCOSE mg/dL 575* 663* 789*   CALCIUM mg/dL 8.5* 8.6 8.7   ANION GAP mmol/L 22* 24* 27*   EGFR mL/min/1.73m*2 12* 13* 13*     Results from last 72 hours   Lab Units 02/16/25  0622 02/15/25  1925   ALK PHOS U/L 113 140*   BILIRUBIN TOTAL mg/dL 0.6 0.8   BILIRUBIN DIRECT mg/dL 0.2  --    PROTEIN TOTAL g/dL 6.4 8.4*   ALT U/L 43 48*   AST U/L 54* 36   ALBUMIN g/dL 3.0* 4.0     Estimated Creatinine Clearance: 11 mL/min (A) (by C-G formula based on SCr of 3.44 mg/dL (H)).  No results found for: \"CRP\", \"SEDRATE\"  No results found for: \"HIV1X2\", \"HIVCONF\", \"OJKYIN1OL\"  No results found for: \"HEPCABINIT\", \"HEPCAB\", \"HCVPCRQUANT\"    Microbiology  No results found for the last 14 days.      Imaging  XR chest 1 view    Result Date: 2/16/2025  1. Endotracheal tube with the tip 2.4 cm above the julius. 2. Nasogastric tube with the tip not included but is beyond the gastroesophageal junction. 3. Interval placement of a right internal jugular central venous catheter with the tip in the superior vena cava; no pneumothorax. 4. Interval development of bandlike atelectasis in the left suprahilar region. Subtle patchy infiltrates particularly in the left lung.   MACRO: None   Signed by: Alexandria Gavin 2/16/2025 12:16 PM Dictation workstation:   [id number]    XR chest 1 view    Result Date: 2/16/2025  1. Intubation with placement of an endotracheal tube 2.1 cm above the julius. 2. Placement of a nasogastric tube with the tip in the mid stomach. There is marked gastric distention. 3. Bilateral patchy pneumonia.   MACRO: None   Signed by: Alexandria Gavin 2/16/2025 12:15 PM Dictation workstation:   [id number]    CT abdomen pelvis wo IV contrast    Result Date: 2/15/2025  Limited by lack of IV contrast.   Disproportionate prominence of the pancreatic head. Fat stranding and " inflammatory changes small amount of fluid surrounding the pancreas. Findings suggest acute pancreatitis. Correlate with clinical and laboratory findings.   Fatty infiltrated liver.   Asymmetrically atrophic left kidney.   MACRO: None   Signed by: Dannie Soto 2/15/2025 10:08 PM Dictation workstation:   [id number]    CT head wo IV contrast    Result Date: 2/15/2025  No acute intracranial abnormality.     MACRO: None.   Signed by: Dominik Covington 2/15/2025 10:00 PM Dictation workstation:   [id number]    XR chest 2 views    Result Date: 2/15/2025  1. Mild bibasilar atelectasis       MACRO: None   Signed by: Juan Lloyd 2/15/2025 8:44 PM Dictation workstation:   [id number]

## 2025-02-16 NOTE — PROGRESS NOTES
Марина Lozano is a 84 y.o. female on day 0 of admission presenting with No Principal Problem: There is no principal problem currently on the Problem List. Please update the Problem List and refresh..      Subjective   The patient is seen in ICU today she is intubated today.  Per reports with coffee ground emesis.  The patient with significant hyperglycemia HHS, pancreatitis and possible GI bleeding.  The patient is DNR but okay to intubate per reports.       Objective     Last Recorded Vitals  /75   Pulse 98   Temp 36.4 °C (97.5 °F) (Temporal)   Resp 16   Wt 69.7 kg (153 lb 10.6 oz)   SpO2 97%   Intake/Output last 3 Shifts:    Intake/Output Summary (Last 24 hours) at 2/16/2025 1225  Last data filed at 2/15/2025 2356  Gross per 24 hour   Intake 7.02 ml   Output --   Net 7.02 ml       Admission Weight  Weight: 71.6 kg (157 lb 13.6 oz) (02/15/25 1908)    Daily Weight  02/16/25 : 69.7 kg (153 lb 10.6 oz)    Image Results  XR chest 1 view  Narrative: Interpreted By:  Alexandria Gavin,   STUDY:  XR CHEST 1 VIEW;  2/16/2025 11:49 am      INDICATION:  Signs/Symptoms:central line.      COMPARISON:  02/16/2025 at 10:39 a.m.      ACCESSION NUMBER(S):  [id number]      ORDERING CLINICIAN:  OZE MERINO      FINDINGS:  CARDIOMEDIASTINAL SILHOUETTE:  Cardiomediastinal silhouette is normal in size and configuration.  There is an endotracheal tube with the tip 2.4 cm above the julius.  There has been interval placement of a right internal jugular central  venous catheter with the tip in the superior vena cava.      LUNGS:  There is no pneumothorax. There is bandlike atelectasis in the left  suprahilar region. There are subtle patchy infiltrates.  There is no pleural fluid.      ABDOMEN:  No remarkable upper abdominal findings.  There is a nasogastric tube with the tip not included but is beyond  the gastroesophageal junction.      BONES:  No acute osseous changes.      Impression: 1. Endotracheal tube with the tip 2.4 cm  above the julius.  2. Nasogastric tube with the tip not included but is beyond the  gastroesophageal junction.  3. Interval placement of a right internal jugular central venous  catheter with the tip in the superior vena cava; no pneumothorax.  4. Interval development of bandlike atelectasis in the left  suprahilar region. Subtle patchy infiltrates particularly in the left  lung.      MACRO:  None      Signed by: Alexandria Gavin 2/16/2025 12:16 PM  Dictation workstation:   [id number]  XR chest 1 view  Narrative: Interpreted By:  Alexandria Gavin,   STUDY:  XR CHEST 1 VIEW;  2/16/2025 10:52 am      INDICATION:  Signs/Symptoms:intubation/NG placement.      COMPARISON:  02/15/2025      ACCESSION NUMBER(S):  [id number]      ORDERING CLINICIAN:  ZOE MERINO      FINDINGS:  CARDIOMEDIASTINAL SILHOUETTE:  Cardiomediastinal silhouette is normal in size and configuration.  There is an endotracheal tube with the tip 2.1 cm above the julius.      LUNGS:  There are subtle bilateral patchy infiltrates. There is no pleural  fluid.      ABDOMEN:  There is marked gastric distention.  There is a nasogastric tube with the tip in the mid stomach and side  hole beyond the gastroesophageal junction.      BONES:  No acute osseous changes.      Impression: 1. Intubation with placement of an endotracheal tube 2.1 cm above the  julius.  2. Placement of a nasogastric tube with the tip in the mid stomach.  There is marked gastric distention.  3. Bilateral patchy pneumonia.      MACRO:  None      Signed by: Alexandria Gavin 2/16/2025 12:15 PM  Dictation workstation:   [id number]      Physical Exam    Relevant Results               Assessment/Plan        This patient has a central line   Reason for the central line remaining today? Parenteral medication    This patient has a urinary catheter   Reason for the urinary catheter remaining today? critically ill patient who need accurate urinary output measurements    This patient is  intubated   Reason for patient to remain intubated today? they are unable to protect their airway        Assessment & Plan  Acute kidney injury superimposed on CKD (CMS-HCC)    Hyperosmolar coma due to type 2 diabetes mellitus (Multi)    Acute pancreatitis    COVID-19    Elevated troponin      Марина Lozano is a 84 y.o. female with PMHx s/f nonID DM2, (diagnosed 2005), HTN, HLD, throat cancer (2007) presenting with HHS and pancreatitis COVID 19 infection.     HHS/hyperglycemia/DM2/dehydration  -On admission, glucose 1248, beta-hydroxybutyrate 6.02, anion gap 32. Negative urine ketones.   -Cont with insulin drip, BG improving at 575 most recent.   -Cont with K containing IV fluids at this time. Adjust with improvement in BG.   -BMPS/lactate/Mag/Phos q4h  -POCT glucose q1h initially.   -Endocrinology consulted     Pancreatitis:  Coffee ground Emesis.   -Lipase 4361, CT imaging showing evidence of pancreatitis  -In setting of COVID 19 infection. Unlikely biliary etiology.   -GI consulted appreciate recommendations. NG tube in place. Trend H&H, No plans for EGD at this time.      Acute COVID-19:  Acute respiratory failure  -The pt with coffee ground emesis and unable to protect airway. Now intubated and managed by Critical care.   -Consult ID for antiviral given likely source of pancreatitis is COVID infection.      JAKY on CKD3:  Serum Cr 3.23, baseline ~1.4. strict I&Os  Fluids currently being administered.  Appreciate nephrology consultation.      Elevated troponins:  -Trops 78 -> 87>>187, no current ACS symptoms. Likely type 2 NSTEMI  -Cardiology was consulted as well.       Lactic Acidosis  -Cont. Aggressive IV fluid rehydration.      DVT Prophylaxis: Heparin subcutaneous    Disposition: ICU.             Sarah Turner MD

## 2025-02-16 NOTE — ED NOTES
Pt arrives to ED via squad from home    Code Status:  DNR    HPI   This is a 84-year-old female past medical history of hypertension, type 2 diabetes on insulin, hyperlipidemia who presents emerged department for altered mental status she was last seen normal 3 days ago. Per family she was found walking around her house naked and confused. Patient states that she has taken her insulin because she is out of it but then she will states she took her insulin but has not been eating well. She is alert and oriented to self. She is complaining of some abdominal discomfort.   Chief Complaint   Patient presents with    Altered Mental Status       BP 98/79 (BP Location: Right arm, Patient Position: Lying)   Pulse 99   Temp 36 °C (96.8 °F) (Temporal)   Resp (!) 23   Wt 71.6 kg (157 lb 13.6 oz)   SpO2 96%     [place] Coma Scale Score: 14      LDA:   Peripheral IV 02/15/25 20 G Right Antecubital (Active)   Placement Date/Time: 02/15/25 1929   Hand Hygiene Completed: Yes  Size (Gauge): 20 G  Orientation: Right  Location: Antecubital   Number of days: 0       Peripheral IV 02/15/25 22 G Left;Anterior Forearm (Active)   Placement Date/Time: 02/15/25 2014   Hand Hygiene Completed: Yes  Size (Gauge): 22 G  Orientation: Left;Anterior  Location: Forearm  Site Prep: Chlorhexidine   Insertion attempts: 1  Patient Tolerance: Tolerated well   Number of days: 0       Peripheral IV 02/16/25 20 G Left Antecubital (Active)   Placement Date/Time: 02/16/25 0245   Hand Hygiene Completed: Yes  Size (Gauge): 20 G  Orientation: Left  Location: Antecubital  Technique: Ultrasound guidance  Placed by: Sascha STRONG   Number of days: 0       Urethral Catheter 16 Fr. (Active)   Placement Date/Time: 02/16/25 0330   Placed by: GIO Sawant RN  Hand Hygiene Completed: Yes  Tube Size (Fr.): 16 Fr.  Catheter Balloon Size: 10 mL  Urine Returned: Yes   Number of days: 0       External Urinary Catheter Female (Active)   Placement Date/Time: 02/15/25 1954   Placed  by: luiz pandya  Hand Hygiene Completed: Yes  External Catheter Type: Female   Number of days: 0        BACKGROUND  Past Medical History:   Diagnosis Date    Encounter for immunization 03/24/2022    Need for shingles vaccine    Throat cancer (Multi) 04/17/2023     Past Surgical History:   Procedure Laterality Date    OTHER SURGICAL HISTORY  11/19/2019    Leg surgery    OTHER SURGICAL HISTORY  11/19/2019    Hysterectomy    OTHER SURGICAL HISTORY  11/19/2019    Ear surgery     No current facility-administered medications on file prior to encounter.     Current Outpatient Medications on File Prior to Encounter   Medication Sig Dispense Refill    amLODIPine-benazepriL (LotreL) 10-20 mg capsule Take 1 capsule by mouth once daily. 90 capsule 1    cholecalciferol (Vitamin D-3) 50 MCG (2000 UT) tablet Take 1 tablet (2,000 Units) by mouth every other day.      glipiZIDE (Glucotrol) 5 mg tablet Take 1 tablet (5 mg) by mouth 2 times a day. 180 tablet 1    hydroCHLOROthiazide (Microzide) 12.5 mg tablet Take 1 tablet (12.5 mg) by mouth once daily. 90 tablet 1    metFORMIN (Glucophage) 500 mg tablet Take 1 tablet (500 mg) by mouth 2 times daily (morning and late afternoon). 180 tablet 1    multivitamin-Ca-iron-minerals (Tab-A-Verna Womens) 27-0.4 mg tablet Take 1 tablet by mouth once daily.      omeprazole (PriLOSEC) 20 mg DR capsule Take 1 capsule (20 mg) by mouth once daily in the morning. Take before meals. DO NOT CUSH OR CHEW 90 capsule 1    OneTouch Delica Plus Lancet 33 gauge misc Inject 1 each into the skin 2 times a day. 200 each 3    OneTouch Verio Flex meter misc CHECK SUGARS THREE TIMES DAILY      OneTouch Verio test strips strip Inject 1 each into the skin 2 times a day. 200 strip 3    pioglitazone (Actos) 15 mg tablet Take 1 tablet (15 mg) by mouth once daily. 90 tablet 1    pravastatin (Pravachol) 20 mg tablet Take 1 tablet (20 mg) by mouth once daily. 90 tablet 1    vitamin E acid succinate (vitamin E succinate) 67  mg (100 unit) tablet Take by mouth.          ASSESSMENT  ED Course as of 02/16/25 0433   Sat Feb 15, 2025   1923 EKG on my read shows sinus tachycardia rate of 101 bpm, ST depressions in leads II, V4 through V6 but no ST elevations, QTc is 489 TX interval is 129.  Otherwise normal intervals. [CF]   1939 POCT Glucose, Venous(!!): >685 [CF]   1939 POCT Lactate, Venous(!!): 8.9 [CF]   1939 POCT pH, Venous(!): 7.26 [CF]   1942 Patient does appear to be in DKA. [CF]   1952 Beta-Hydroxybutyrate(!): 6.02 [CF]   1957 Anion Gap(!): 32 [CF]   1957 Creatinine(!): 2.97 [CF]   2009 LIPASE(!): 4,361 [CF]   2020 Coronavirus 2019, PCR(!): Detected [CF]   2128 IMPRESSION:  1. Mild bibasilar atelectasis   [CF]   2207 IMPRESSION:  No acute intracranial abnormality.   [CF]   2211 IMPRESSION:  Limited by lack of IV contrast.      Disproportionate prominence of the pancreatic head. Fat stranding and  inflammatory changes small amount of fluid surrounding the pancreas.  Findings suggest acute pancreatitis. Correlate with clinical and  laboratory findings.      Fatty infiltrated liver.      Asymmetrically atrophic left kidney.       [CF]      ED Course User Index  [CF] Johanny Strauss MD         Diagnoses as of 02/16/25 0433   DKA, type 2, not at goal   Acute pancreatitis, unspecified complication status, unspecified pancreatitis type ([facility])   COVID       Medications Currently Running:  D5 % and 0.9 % sodium chloride, 150 mL/hr  dextrose 10 % in water (D10W), 150 mL/hr  dextrose 10 % in water (D10W), 150 mL/hr  dextrose 10 % in water (D10W), 150 mL/hr  insulin regular, 0-50 Units/hr, Last Rate: 3.6 Units/hr (02/16/25 0338)  potassium chloride-0.45 % NaCl, 150 mL/hr, Last Rate: 150 mL/hr (02/16/25 6569)         Medications Given:  ED Medication Administration from 02/15/2025 1903 to 02/16/2025 0433         Date/Time Order Dose Route Action Action by     02/15/2025 1939 EST lactated Ringer's bolus 1,000 mL 1,000 mL intravenous New Bag  Sawant,      02/15/2025 2009 EST lactated Ringer's bolus 1,000 mL 1,000 mL intravenous New Bag Edwin, C     02/15/2025 2102 EST potassium chloride 20 mEq in sterile water for injection 100 mL 20 mEq intravenous New Bag Sawant,      02/15/2025 2158 EST insulin regular 100 unit/100 mL (1 unit/mL) in 0.9 % NaCl infusion 3.6 Units/hr intravenous New Bag [facility]     02/15/2025 2201 EST insulin regular (HumuLIN, NovoLIN) bolus from bag 7 Units 7 Units intravenous Bolus from Bag Sawant,      02/15/2025 2220 EST lactated Ringer's bolus 1,000 mL 0 mL intravenous Stopped Sawant, J     02/15/2025 2220 EST lactated Ringer's bolus 1,000 mL 0 mL intravenous Stopped Sawant, J     02/15/2025 2225 EST sodium chloride 0.45 % infusion 250 mL/hr intravenous [facility]     02/15/2025 2313 EST pravastatin (Pravachol) tablet 20 mg -- oral Held by provider HAN Doyle     02/15/2025 2315 EST potassium chloride 20 mEq in sterile water for injection 100 mL 0 mEq intravenous Stopped Sawant, J     02/15/2025 2318 EST insulin regular 100 unit/100 mL (1 unit/mL) in 0.9 % NaCl infusion 0 Units/hr intravenous Stopped Sawant, J     02/15/2025 2318 EST sodium chloride 0.45 % infusion 0 mL/hr intravenous Stopped Sawant, J     02/15/2025 2318 EST sodium chloride 0.45 % infusion 250 mL/hr intravenous New Bag [facility]     02/15/2025 2319 EST insulin regular 100 unit/100 mL (1 unit/mL) in 0.9 % NaCl infusion 3.6 Units/hr intravenous New Bag [facility]     02/15/2025 2345 EST heparin (porcine) injection 5,000 Units 5,000 Units subcutaneous Given Sawant, J     02/15/2025 2356 EST insulin regular 100 unit/100 mL (1 unit/mL) in 0.9 % NaCl infusion 0 Units/hr intravenous Stopped [facility]     02/15/2025 2359 EST potassium chloride 20 mEq in sterile water for injection 100 mL 20 mEq intravenous New Bag [facility]     02/16/2025 0121 EST sodium chloride 0.45 % infusion 250 mL/hr intravenous Rate/Dose Verify TRUDY Sawant     02/16/2025 0149 EST potassium chloride 20 mEq in sterile  water for injection 100 mL 0 mEq intravenous Stopped TRUDY Sawant     02/16/2025 0203 EST potassium chloride 20 mEq in sterile water for injection 100 mL 20 mEq intravenous New Bag TRUDY Sawant     02/16/2025 0324 EST sodium chloride 0.45 % infusion 0 mL/hr intravenous Stopped TRUDY Sawant     02/16/2025 0338 EST insulin regular 100 unit/100 mL (1 unit/mL) in 0.9 % NaCl infusion 3.6 Units/hr intravenous Restarted TRUDY Sawatn     02/16/2025 0359 EST sodium chloride 0.45 % with KCl 20 mEq/L infusion 150 mL/hr intravenous New Bag TRUDY Sawant     02/16/2025 0900 EST pravastatin (Pravachol) tablet 20 mg -- oral Dose Auto Held Polverine, S     02/17/2025 0900 EST pravastatin (Pravachol) tablet 20 mg -- oral Dose Auto Held Polverine, S     02/18/2025 0900 EST pravastatin (Pravachol) tablet 20 mg -- oral Dose Auto Held Polverine, S     02/19/2025 0900 EST pravastatin (Pravachol) tablet 20 mg -- oral Dose Auto Held Polverine, S     02/20/2025 0900 EST pravastatin (Pravachol) tablet 20 mg -- oral Dose Auto Held Polverine, S                 RESULTS    Imaging:  CT head wo IV contrast   Final Result   No acute intracranial abnormality.             MACRO:   None.        Signed by: Dominik Covington 2/15/2025 10:00 PM   Dictation workstation:   [id number]      CT abdomen pelvis wo IV contrast   Final Result   Limited by lack of IV contrast.        Disproportionate prominence of the pancreatic head. Fat stranding and   inflammatory changes small amount of fluid surrounding the pancreas.   Findings suggest acute pancreatitis. Correlate with clinical and   laboratory findings.        Fatty infiltrated liver.        Asymmetrically atrophic left kidney.        MACRO:   None        Signed by: Dannie Soto 2/15/2025 10:08 PM   Dictation workstation:   [id number]      XR chest 2 views   Final Result   1. Mild bibasilar atelectasis                  MACRO:   None        Signed by: Juan Lloyd 2/15/2025 8:44 PM   Dictation workstation:   [id number]          }  Labs ::99  Abnormal Labs Reviewed   BETA HYDROXYBUTYRATE - Abnormal; Notable for the following components:       Result Value    Beta-Hydroxybutyrate 6.02 (*)     All other components within normal limits    Narrative:     The beta-hydroxybutyrate test performance characteristics have been validated by  [facility]. This test has not been approved by the FDA; however,such approval is not necessary.     CBC WITH AUTO DIFFERENTIAL - Abnormal; Notable for the following components:    WBC 12.8 (*)     RBC 6.46 (*)     Hemoglobin 16.9 (*)     Hematocrit 54.0 (*)     MCHC 31.3 (*)     RDW 15.4 (*)     Neutrophils Absolute 11.65 (*)     Lymphocytes Absolute 0.63 (*)     All other components within normal limits   MAGNESIUM - Abnormal; Notable for the following components:    Magnesium 3.29 (*)     All other components within normal limits   COMPREHENSIVE METABOLIC PANEL - Abnormal; Notable for the following components:    Glucose 1,248 (*)     Sodium 134 (*)     Potassium 3.4 (*)     Chloride 85 (*)     Bicarbonate 20 (*)     Anion Gap 32 (*)     Urea Nitrogen 53 (*)     Creatinine 2.97 (*)     eGFR 15 (*)     Alkaline Phosphatase 140 (*)     Total Protein 8.4 (*)     ALT 48 (*)     All other components within normal limits   LIPASE - Abnormal; Notable for the following components:    Lipase 4,361 (*)     All other components within normal limits    Narrative:     Venipuncture immediately after or during the administration of Metamizole may lead to falsely low results. Testing should be performed immediately prior to Metamizole dosing.   TROPONIN I, HIGH SENSITIVITY - Abnormal; Notable for the following components:    Troponin I, High Sensitivity 78 (*)     All other components within normal limits    Narrative:     Less than 99th percentile of normal range cutoff-                  Female and children under 18 years old <14 ng/L; Male <21 ng/L: Negative                   Repeat testing should be performed if clinically indicated.                                     Female and children under 18 years old 14-50 ng/L; Male 21-50 ng/L:                  Consistent with possible cardiac damage and possible increased clinical                   risk. Serial measurements may help to assess extent of myocardial damage.                                     >50 ng/L: Consistent with cardiac damage, increased clinical risk and                  myocardial infarction. Serial measurements may help assess extent of                   myocardial damage.                                      NOTE: Children less than 1 year old may have higher baseline troponin                   levels and results should be interpreted in conjunction with the overall                   clinical context.                                     NOTE: Troponin I testing is performed using a different                   testing methodology at [facility] than at other                   [facility]. Direct result comparisons should only                   be made within the same method.   BLOOD GAS VENOUS FULL PANEL - Abnormal; Notable for the following components:    POCT pH, Venous 7.26 (*)     POCT pCO2, Venous 53 (*)     POCT Hematocrit Calculated, Venous 53.0 (*)     POCT Sodium, Venous 132 (*)     POCT Chloride, Venous 88 (*)     POCT Glucose, Venous >685 (*)     POCT Lactate, Venous 8.9 (*)     POCT Base Excess, Venous -4.2 (*)     POCT Hemoglobin, Venous 17.6 (*)     All other components within normal limits   SARS-COV-2 PCR - Abnormal; Notable for the following components:    Coronavirus 2019, PCR Detected (*)     All other components within normal limits    Narrative:     This assay is an FDA-cleared, in vitro diagnostic nucleic acid amplification test for the qualitative detection and differentiation of SARS CoV-2 from nasopharyngeal specimens collected from individuals with signs and symptoms of  respiratory tract infections, and has been validated for use at [facility]. Negative results do not preclude COVID-19 infections and should not be used as the sole basis for diagnosis, treatment, or other management decisions. Testing for SARS CoV-2 is recommended only for patients who meet current clinical and/or epidemiological criteria defined by federal, state, or local public health directives.   TROPONIN I, HIGH SENSITIVITY - Abnormal; Notable for the following components:    Troponin I, High Sensitivity 87 (*)     All other components within normal limits    Narrative:     Less than 99th percentile of normal range cutoff-                  Female and children under 18 years old <14 ng/L; Male <21 ng/L: Negative                  Repeat testing should be performed if clinically indicated.                                     Female and children under 18 years old 14-50 ng/L; Male 21-50 ng/L:                  Consistent with possible cardiac damage and possible increased clinical                   risk. Serial measurements may help to assess extent of myocardial damage.                                     >50 ng/L: Consistent with cardiac damage, increased clinical risk and                  myocardial infarction. Serial measurements may help assess extent of                   myocardial damage.                                      NOTE: Children less than 1 year old may have higher baseline troponin                   levels and results should be interpreted in conjunction with the overall                   clinical context.                                     NOTE: Troponin I testing is performed using a different                   testing methodology at [facility] than at other                   [facility]. Direct result comparisons should only                   be made within the same method.   B-TYPE NATRIURETIC PEPTIDE - Abnormal; Notable for the following  components:     (*)     All other components within normal limits    Narrative:        <100 pg/mL - Heart failure unlikely                  100-299 pg/mL - Intermediate probability of acute heart                                  failure exacerbation. Correlate with clinical                                  context and patient history.                    >=300 pg/mL - Heart Failure likely. Correlate with clinical                                  context and patient history.                                    BNP testing is performed using different testing methodology at [facility] than at other [facility]. Direct result comparisons should only be made within the same method.                      LIPID PANEL NON-FASTING - Abnormal; Notable for the following components:    Non-HDL Cholesterol 154 (*)     All other components within normal limits   BASIC METABOLIC PANEL - Abnormal; Notable for the following components:    Glucose 1,035 (*)     Potassium 3.0 (*)     Chloride 92 (*)     Anion Gap 27 (*)     Urea Nitrogen 54 (*)     Creatinine 2.98 (*)     eGFR 15 (*)     All other components within normal limits   LACTATE - Abnormal; Notable for the following components:    Lactate 6.6 (*)     All other components within normal limits    Narrative:     Venipuncture immediately after or during the administration of Metamizole may lead to falsely low results. Testing should be performed immediately prior to Metamizole dosing.   TRIGLYCERIDES - Abnormal; Notable for the following components:    Triglycerides 372 (*)     All other components within normal limits   TROPONIN I, HIGH SENSITIVITY - Abnormal; Notable for the following components:    Troponin I, High Sensitivity 89 (*)     All other components within normal limits    Narrative:     Less than 99th percentile of normal range cutoff-                  Female and children under 18 years old <14 ng/L; Male <21 ng/L: Negative                   Repeat testing should be performed if clinically indicated.                                     Female and children under 18 years old 14-50 ng/L; Male 21-50 ng/L:                  Consistent with possible cardiac damage and possible increased clinical                   risk. Serial measurements may help to assess extent of myocardial damage.                                     >50 ng/L: Consistent with cardiac damage, increased clinical risk and                  myocardial infarction. Serial measurements may help assess extent of                   myocardial damage.                                      NOTE: Children less than 1 year old may have higher baseline troponin                   levels and results should be interpreted in conjunction with the overall                   clinical context.                                     NOTE: Troponin I testing is performed using a different                   testing methodology at [facility] than at other                   [facility]. Direct result comparisons should only                   be made within the same method.   MAGNESIUM - Abnormal; Notable for the following components:    Magnesium 2.74 (*)     All other components within normal limits   BASIC METABOLIC PANEL - Abnormal; Notable for the following components:    Glucose 998 (*)     Sodium 132 (*)     Chloride 94 (*)     Bicarbonate 16 (*)     Anion Gap 27 (*)     Urea Nitrogen 55 (*)     Creatinine 3.03 (*)     eGFR 15 (*)     All other components within normal limits   LACTATE - Abnormal; Notable for the following components:    Lactate 5.5 (*)     All other components within normal limits    Narrative:     Venipuncture immediately after or during the administration of Metamizole may lead to falsely low results. Testing should be performed immediately prior to Metamizole dosing.   POCT GLUCOSE - Abnormal; Notable for the following components:    POCT Glucose >600 (*)     All other  components within normal limits   POCT GLUCOSE - Abnormal; Notable for the following components:    POCT Glucose >600 (*)     All other components within normal limits   POCT GLUCOSE - Abnormal; Notable for the following components:    POCT Glucose >600 (*)     All other components within normal limits   POCT GLUCOSE - Abnormal; Notable for the following components:    POCT Glucose >600 (*)     All other components within normal limits            Report called to Donna Sawant RN  02/16/25 0431

## 2025-02-16 NOTE — PROGRESS NOTES
Critical Care Daily Progress Notes        Subjective   Patient is a 84 y.o. female admitted on 2/15/2025  7:04 PM with the following indication(s) for ICU care: COVID, HSS, pancreatitis    Interval History:     -This morning large-volume coffee-ground emesis  -Unable to protect airway with impending respiratory failure  -Required emergent intubation    Scheduled Medications:   cefTRIAXone, 1 g, intravenous, q24h  heparin (porcine), 5,000 Units, subcutaneous, q8h NISHANT  oxygen, , inhalation, Continuous - Inhalation  pantoprazole, 40 mg, oral, Daily before breakfast   Or  pantoprazole, 40 mg, intravenous, Daily before breakfast  [Held by provider] pravastatin, 20 mg, oral, Daily         Continuous Medications:   dextrose 10 % in water (D10W), 150 mL/hr  dextrose 10 % in water (D10W), 150 mL/hr  dextrose 10 % in water (D10W), 150 mL/hr  fentaNYL, 0-200 mcg/hr  insulin regular, 0-50 Units/hr, Last Rate: 5.6 Units/hr (02/16/25 1027)  propofol, 0-20 mcg/kg/min, Last Rate: 5 mcg/kg/min (02/16/25 1058)  potassium chloride-0.45 % NaCl, 150 mL/hr, Last Rate: 150 mL/hr (02/16/25 3929)         PRN Medications:   PRN medications: bisacodyl, dextrose 10 % in water (D10W), dextrose 10 % in water (D10W), dextrose 10 % in water (D10W), dextrose, guaiFENesin, insulin regular, melatonin, ondansetron **OR** ondansetron    Objective   Vitals:  Most Recent:  Vitals:    02/16/25 1100   BP: 103/75   Pulse: 98   Resp: 16   Temp:    SpO2: 97%       24hr Min/Max:  Temp  Min: 36 °C (96.8 °F)  Max: 36.4 °C (97.5 °F)  Pulse  Min: 87  Max: 105  BP  Min: 93/68  Max: 148/72  Resp  Min: 13  Max: 57  SpO2  Min: 82 %  Max: 97 %    LDA:  CVC 02/16/25 Triple lumen Non-tunneled Right Internal jugular (Active)   Placement Date/Time: 02/16/25 1055   Site Prep: Chlorhexidine   Site Prep Agent has Completely Dried Before Insertion: Yes  All 5 Sterile Barriers Used (Gloves, Gown, Cap, Mask, Large Sterile Drape): Yes  Lumen Type: Triple lumen  CVC Type:  Non-tunnel...   Number of days: 0       Urethral Catheter 16 Fr. (Active)   Placement Date/Time: 02/16/25 0330   Placed by: GIO Sawant RN  Hand Hygiene Completed: Yes  Tube Size (Fr.): 16 Fr.  Catheter Balloon Size: 10 mL  Urine Returned: Yes   Number of days: 0         Vent settings:       Hemodynamic parameters for last 24 hours:       I/O:    Intake/Output Summary (Last 24 hours) at 2/16/2025 1204  Last data filed at 2/15/2025 2356  Gross per 24 hour   Intake 7.02 ml   Output --   Net 7.02 ml       Physical Exam:   Physical Exam  Vitals and nursing note reviewed.   Constitutional:       Comments: Elderly, cephalopathic, unable to follow commands   HENT:      Head: Normocephalic and atraumatic.      Mouth/Throat:      Comments: Significant amount of coffee-ground emesis  Eyes:      Pupils: Pupils are equal, round, and reactive to light.   Cardiovascular:      Rate and Rhythm: Regular rhythm. Tachycardia present.   Pulmonary:      Comments: Coarse breath sound anteriorly  Abdominal:      Palpations: Abdomen is soft.      Comments: No rebound   Musculoskeletal:         General: No swelling.   Skin:     General: Skin is dry.      Capillary Refill: Capillary refill takes more than 3 seconds.      Comments: cool   Neurological:      Mental Status: She is disoriented.      Comments: No focal deficit, large encephalopathic          Lab/Radiology/Diagnostic Review:  Results for orders placed or performed during the hospital encounter of 02/15/25 (from the past 24 hours)   POCT GLUCOSE   Result Value Ref Range    POCT Glucose >600 (H) 74 - 99 mg/dL   Beta Hydroxybutyrate   Result Value Ref Range    Beta-Hydroxybutyrate 6.02 (H) 0.02 - 0.27 mmol/L   CBC and Auto Differential   Result Value Ref Range    WBC 12.8 (H) 4.4 - 11.3 x10*3/uL    nRBC 0.0 0.0 - 0.0 /100 WBCs    RBC 6.46 (H) 4.00 - 5.20 x10*6/uL    Hemoglobin 16.9 (H) 12.0 - 16.0 g/dL    Hematocrit 54.0 (H) 36.0 - 46.0 %    MCV 84 80 - 100 fL    MCH 26.2 26.0 - 34.0 pg     MCHC 31.3 (L) 32.0 - 36.0 g/dL    RDW 15.4 (H) 11.5 - 14.5 %    Platelets 234 150 - 450 x10*3/uL    Neutrophils % 90.7 40.0 - 80.0 %    Immature Granulocytes %, Automated 0.3 0.0 - 0.9 %    Lymphocytes % 4.9 13.0 - 44.0 %    Monocytes % 3.9 2.0 - 10.0 %    Eosinophils % 0.0 0.0 - 6.0 %    Basophils % 0.2 0.0 - 2.0 %    Neutrophils Absolute 11.65 (H) 1.60 - 5.50 x10*3/uL    Immature Granulocytes Absolute, Automated 0.04 0.00 - 0.50 x10*3/uL    Lymphocytes Absolute 0.63 (L) 0.80 - 3.00 x10*3/uL    Monocytes Absolute 0.50 0.05 - 0.80 x10*3/uL    Eosinophils Absolute 0.00 0.00 - 0.40 x10*3/uL    Basophils Absolute 0.02 0.00 - 0.10 x10*3/uL   Magnesium   Result Value Ref Range    Magnesium 3.29 (H) 1.60 - 2.40 mg/dL   Comprehensive metabolic panel   Result Value Ref Range    Glucose 1,248 (HH) 74 - 99 mg/dL    Sodium 134 (L) 136 - 145 mmol/L    Potassium 3.4 (L) 3.5 - 5.3 mmol/L    Chloride 85 (L) 98 - 107 mmol/L    Bicarbonate 20 (L) 21 - 32 mmol/L    Anion Gap 32 (H) 10 - 20 mmol/L    Urea Nitrogen 53 (H) 6 - 23 mg/dL    Creatinine 2.97 (H) 0.50 - 1.05 mg/dL    eGFR 15 (L) >60 mL/min/1.73m*2    Calcium 10.1 8.6 - 10.3 mg/dL    Albumin 4.0 3.4 - 5.0 g/dL    Alkaline Phosphatase 140 (H) 33 - 136 U/L    Total Protein 8.4 (H) 6.4 - 8.2 g/dL    AST 36 9 - 39 U/L    Bilirubin, Total 0.8 0.0 - 1.2 mg/dL    ALT 48 (H) 7 - 45 U/L   Lipase   Result Value Ref Range    Lipase 4,361 (H) 9 - 82 U/L   Troponin I, High Sensitivity   Result Value Ref Range    Troponin I, High Sensitivity 78 (HH) 0 - 13 ng/L   Blood Gas Venous Full Panel   Result Value Ref Range    POCT pH, Venous 7.26 (L) 7.33 - 7.43 pH    POCT pCO2, Venous 53 (H) 41 - 51 mm Hg    POCT pO2, Venous 36 35 - 45 mm Hg    POCT SO2, Venous 52 45 - 75 %    POCT Oxy Hemoglobin, Venous 51.6 45.0 - 75.0 %    POCT Hematocrit Calculated, Venous 53.0 (H) 36.0 - 46.0 %    POCT Sodium, Venous 132 (L) 136 - 145 mmol/L    POCT Potassium, Venous 3.7 3.5 - 5.3 mmol/L    POCT Chloride,  Venous 88 (L) 98 - 107 mmol/L    POCT Ionized Calicum, Venous 1.23 1.10 - 1.33 mmol/L    POCT Glucose, Venous >685 (HH) 74 - 99 mg/dL    POCT Lactate, Venous 8.9 (HH) 0.4 - 2.0 mmol/L    POCT Base Excess, Venous -4.2 (L) -2.0 - 3.0 mmol/L    POCT HCO3 Calculated, Venous 23.8 22.0 - 26.0 mmol/L    POCT Hemoglobin, Venous 17.6 (H) 12.0 - 16.0 g/dL    POCT Anion Gap, Venous 24.0 10.0 - 25.0 mmol/L    Patient Temperature 37.0 degrees Celsius    FiO2 21 %   Sars-CoV-2 PCR   Result Value Ref Range    Coronavirus 2019, PCR Detected (A) Not Detected   Influenza A, and B PCR   Result Value Ref Range    Flu A Result Not Detected Not Detected    Flu B Result Not Detected Not Detected   Lipid Panel Non-Fasting   Result Value Ref Range    Cholesterol 199 0 - 199 mg/dL    HDL-Cholesterol 45.4 mg/dL    Cholesterol/HDL Ratio 4.4     Non-HDL Cholesterol 154 (H) 0 - 149 mg/dL   Triglycerides   Result Value Ref Range    Triglycerides 372 (H) 0 - 149 mg/dL   Troponin I, High Sensitivity   Result Value Ref Range    Troponin I, High Sensitivity 87 (HH) 0 - 13 ng/L   B-Type Natriuretic Peptide   Result Value Ref Range     (H) 0 - 99 pg/mL   Osmolality   Result Value Ref Range    Osmolality, Serum 390 (H) 280 - 300 mOsm/kg   Basic metabolic panel   Result Value Ref Range    Glucose 1,035 (HH) 74 - 99 mg/dL    Sodium 137 136 - 145 mmol/L    Potassium 3.0 (L) 3.5 - 5.3 mmol/L    Chloride 92 (L) 98 - 107 mmol/L    Bicarbonate 21 21 - 32 mmol/L    Anion Gap 27 (H) 10 - 20 mmol/L    Urea Nitrogen 54 (H) 6 - 23 mg/dL    Creatinine 2.98 (H) 0.50 - 1.05 mg/dL    eGFR 15 (L) >60 mL/min/1.73m*2    Calcium 9.2 8.6 - 10.3 mg/dL   Magnesium   Result Value Ref Range    Magnesium 2.74 (H) 1.60 - 2.40 mg/dL   Phosphorus   Result Value Ref Range    Phosphorus 4.0 2.5 - 4.9 mg/dL   Lactate   Result Value Ref Range    Lactate 6.6 (HH) 0.4 - 2.0 mmol/L   Troponin I, High Sensitivity   Result Value Ref Range    Troponin I, High Sensitivity 89 (HH) 0 -  13 ng/L   POCT GLUCOSE   Result Value Ref Range    POCT Glucose >600 (H) 74 - 99 mg/dL   Lactate   Result Value Ref Range    Lactate 5.5 (HH) 0.4 - 2.0 mmol/L   Basic Metabolic Panel   Result Value Ref Range    Glucose 998 (HH) 74 - 99 mg/dL    Sodium 132 (L) 136 - 145 mmol/L    Potassium 4.6 3.5 - 5.3 mmol/L    Chloride 94 (L) 98 - 107 mmol/L    Bicarbonate 16 (L) 21 - 32 mmol/L    Anion Gap 27 (H) 10 - 20 mmol/L    Urea Nitrogen 55 (H) 6 - 23 mg/dL    Creatinine 3.03 (H) 0.50 - 1.05 mg/dL    eGFR 15 (L) >60 mL/min/1.73m*2    Calcium 8.8 8.6 - 10.3 mg/dL   POCT GLUCOSE   Result Value Ref Range    POCT Glucose >600 (H) 74 - 99 mg/dL   Urinalysis with Reflex Culture and Microscopic   Result Value Ref Range    Color, Urine Light-Yellow Light-Yellow, Yellow, Dark-Yellow    Appearance, Urine Turbid (N) Clear    Specific Gravity, Urine 1.025 1.005 - 1.035    pH, Urine 5.0 5.0, 5.5, 6.0, 6.5, 7.0, 7.5, 8.0    Protein, Urine 20 (TRACE) NEGATIVE, 10 (TRACE), 20 (TRACE) mg/dL    Glucose, Urine OVER (4+) (A) Normal mg/dL    Blood, Urine NEGATIVE NEGATIVE mg/dL    Ketones, Urine NEGATIVE NEGATIVE mg/dL    Bilirubin, Urine NEGATIVE NEGATIVE mg/dL    Urobilinogen, Urine Normal Normal mg/dL    Nitrite, Urine NEGATIVE NEGATIVE    Leukocyte Esterase, Urine 250 Lynda/uL (A) NEGATIVE   Microscopic Only, Urine   Result Value Ref Range    WBC, Urine 6-10 (A) 1-5, NONE /HPF    WBC Clumps, Urine OCCASIONAL Reference range not established. /HPF    RBC, Urine 6-10 (A) NONE, 1-2, 3-5 /HPF    Squamous Epithelial Cells, Urine 1-9 (SPARSE) Reference range not established. /HPF    Bacteria, Urine 1+ (A) NONE SEEN /HPF    Mucus, Urine FEW Reference range not established. /LPF    Hyaline Casts, Urine OCCASIONAL (A) NONE /LPF    Amorphous Crystals, Urine 1+ NONE, 1+, 2+ /HPF   Lactate   Result Value Ref Range    Lactate 5.0 (HH) 0.4 - 2.0 mmol/L   Troponin I, High Sensitivity   Result Value Ref Range    Troponin I, High Sensitivity 102 (HH) 0 - 13  ng/L   Glucose, random   Result Value Ref Range    Glucose 990 (HH) 74 - 99 mg/dL   POCT GLUCOSE   Result Value Ref Range    POCT Glucose >600 (H) 74 - 99 mg/dL   CBC and Auto Differential   Result Value Ref Range    WBC 12.9 (H) 4.4 - 11.3 x10*3/uL    nRBC 0.0 0.0 - 0.0 /100 WBCs    RBC 5.88 (H) 4.00 - 5.20 x10*6/uL    Hemoglobin 15.7 12.0 - 16.0 g/dL    Hematocrit 49.3 (H) 36.0 - 46.0 %    MCV 84 80 - 100 fL    MCH 26.7 26.0 - 34.0 pg    MCHC 31.8 (L) 32.0 - 36.0 g/dL    RDW 14.6 (H) 11.5 - 14.5 %    Platelets 131 (L) 150 - 450 x10*3/uL    Neutrophils % 90.8 40.0 - 80.0 %    Immature Granulocytes %, Automated 0.5 0.0 - 0.9 %    Lymphocytes % 5.2 13.0 - 44.0 %    Monocytes % 1.8 2.0 - 10.0 %    Eosinophils % 1.4 0.0 - 6.0 %    Basophils % 0.3 0.0 - 2.0 %    Neutrophils Absolute 11.66 (H) 1.60 - 5.50 x10*3/uL    Immature Granulocytes Absolute, Automated 0.07 0.00 - 0.50 x10*3/uL    Lymphocytes Absolute 0.67 (L) 0.80 - 3.00 x10*3/uL    Monocytes Absolute 0.23 0.05 - 0.80 x10*3/uL    Eosinophils Absolute 0.18 0.00 - 0.40 x10*3/uL    Basophils Absolute 0.04 0.00 - 0.10 x10*3/uL   Blood Culture    Specimen: Peripheral Venipuncture; Blood culture   Result Value Ref Range    Blood Culture Loaded on Instrument - Culture in progress    Blood Culture    Specimen: Peripheral Venipuncture; Blood culture   Result Value Ref Range    Blood Culture Loaded on Instrument - Culture in progress    Hepatic Function Panel   Result Value Ref Range    Albumin 3.0 (L) 3.4 - 5.0 g/dL    Bilirubin, Total 0.6 0.0 - 1.2 mg/dL    Bilirubin, Direct 0.2 0.0 - 0.3 mg/dL    Alkaline Phosphatase 113 33 - 136 U/L    ALT 43 7 - 45 U/L    AST 54 (H) 9 - 39 U/L    Total Protein 6.4 6.4 - 8.2 g/dL   Basic metabolic panel   Result Value Ref Range    Glucose 899 (HH) 74 - 99 mg/dL    Sodium 137 136 - 145 mmol/L    Potassium 3.9 3.5 - 5.3 mmol/L    Chloride 94 (L) 98 - 107 mmol/L    Bicarbonate 20 (L) 21 - 32 mmol/L    Anion Gap 27 (H) 10 - 20 mmol/L     Urea Nitrogen 56 (H) 6 - 23 mg/dL    Creatinine 3.23 (H) 0.50 - 1.05 mg/dL    eGFR 13 (L) >60 mL/min/1.73m*2    Calcium 8.9 8.6 - 10.3 mg/dL   Magnesium   Result Value Ref Range    Magnesium 2.59 (H) 1.60 - 2.40 mg/dL   Phosphorus   Result Value Ref Range    Phosphorus 3.2 2.5 - 4.9 mg/dL   POCT GLUCOSE   Result Value Ref Range    POCT Glucose >600 (H) 74 - 99 mg/dL   Lactate   Result Value Ref Range    Lactate 7.5 (HH) 0.4 - 2.0 mmol/L   Troponin I, High Sensitivity   Result Value Ref Range    Troponin I, High Sensitivity 133 (HH) 0 - 13 ng/L   Magnesium   Result Value Ref Range    Magnesium 2.45 (H) 1.60 - 2.40 mg/dL   Phosphorus   Result Value Ref Range    Phosphorus 3.0 2.5 - 4.9 mg/dL   Basic metabolic panel   Result Value Ref Range    Glucose 789 (HH) 74 - 99 mg/dL    Sodium 136 136 - 145 mmol/L    Potassium 4.2 3.5 - 5.3 mmol/L    Chloride 96 (L) 98 - 107 mmol/L    Bicarbonate 17 (L) 21 - 32 mmol/L    Anion Gap 27 (H) 10 - 20 mmol/L    Urea Nitrogen 56 (H) 6 - 23 mg/dL    Creatinine 3.29 (H) 0.50 - 1.05 mg/dL    eGFR 13 (L) >60 mL/min/1.73m*2    Calcium 8.7 8.6 - 10.3 mg/dL   Basic metabolic panel   Result Value Ref Range    Glucose 663 (HH) 74 - 99 mg/dL    Sodium 138 136 - 145 mmol/L    Potassium 3.9 3.5 - 5.3 mmol/L    Chloride 96 (L) 98 - 107 mmol/L    Bicarbonate 22 21 - 32 mmol/L    Anion Gap 24 (H) 10 - 20 mmol/L    Urea Nitrogen 56 (H) 6 - 23 mg/dL    Creatinine 3.36 (H) 0.50 - 1.05 mg/dL    eGFR 13 (L) >60 mL/min/1.73m*2    Calcium 8.6 8.6 - 10.3 mg/dL   Lactate   Result Value Ref Range    Lactate 8.1 (HH) 0.4 - 2.0 mmol/L   Protime-INR   Result Value Ref Range    Protime 13.0 (H) 9.8 - 12.8 seconds    INR 1.2 (H) 0.9 - 1.1   Fibrinogen   Result Value Ref Range    Fibrinogen 170 (L) 200 - 400 mg/dL   Type and screen   Result Value Ref Range    ABO TYPE O     Rh TYPE POS     ANTIBODY SCREEN NEG    Prepare RBC: 1 Units   Result Value Ref Range    PRODUCT CODE N7835K02     Unit Number [id number]      Unit ABO O     Unit RH POS     XM INTEP COMP     Dispense Status XM     Blood Expiration Date 3/15/2025 11:59:00 PM EDT     PRODUCT BLOOD TYPE 5100     UNIT VOLUME 292    Hemoglobin and hematocrit, blood   Result Value Ref Range    Hemoglobin 12.9 12.0 - 16.0 g/dL    Hematocrit 40.8 36.0 - 46.0 %   ABO/Rh   Result Value Ref Range    ABO TYPE O     Rh TYPE POS    POCT GLUCOSE   Result Value Ref Range    POCT Glucose >600 (H) 74 - 99 mg/dL     CT abdomen pelvis wo IV contrast    Result Date: 2/15/2025  Interpreted By:  Dannie Soto, STUDY: CT ABDOMEN PELVIS WO IV CONTRAST;  2/15/2025 8:58 pm   INDICATION: Signs/Symptoms:abdomional pain, diffuse, lipase is 4K.     COMPARISON: None   ACCESSION NUMBER(S): [id number]   ORDERING CLINICIAN: DAMON JOSE   TECHNIQUE: CT of the abdomen and pelvis was performed. Contiguous axial images were obtained at 3 mm slice thickness through the abdomen and pelvis. Coronal and sagittal reconstructions at 3 mm slice thickness were performed.  No intravenous contrast was administered.   FINDINGS: Please note that the evaluation of vessels, lymph nodes and organs is limited without intravenous contrast.   LOWER CHEST: The included lung bases demonstrate hyperinflation. Aortic calcification. Small hiatal hernia. Coronary artery calcification.   ABDOMEN:   LIVER: The liver is diminished in attenuation suggesting fatty infiltration. No definite focal liver lesions within the limits of this unenhanced study.   BILE DUCTS: No bile duct dilatation.   GALLBLADDER: No definite gallstones. No gallbladder wall thickening.   PANCREAS: There is enlargement of the pancreatic head with surrounding fat stranding and inflammatory changes. There is fat stranding surrounding the entire pancreas but especially the pancreatic head and tail. Findings suggest likely acute pancreatitis.   SPLEEN: Unremarkable spleen.   ADRENAL GLANDS: No adrenal masses.   KIDNEYS AND URETERS: Unremarkable right  kidney. Atrophic left kidney. No hydronephrosis. No hydroureter.   PELVIS:   BLADDER: Suboptimally distended and evaluated.   REPRODUCTIVE ORGANS: No definite masses.   BOWEL: No evidence of bowel obstruction. Diverticulosis without definite diverticulitis. Unremarkable appendix.   VESSELS: Aortic calcification. No aneurysmal dilatation. Calcification at the origin of both renal arteries.   PERITONEUM/RETROPERITONEUM/LYMPH NODES: No retroperitoneal adenopathy. No ascites.   ABDOMINAL WALL: Tiny fat containing umbilical hernia.   BONES: Discogenic degenerative changes. Vacuum disc at multiple levels.       Limited by lack of IV contrast.   Disproportionate prominence of the pancreatic head. Fat stranding and inflammatory changes small amount of fluid surrounding the pancreas. Findings suggest acute pancreatitis. Correlate with clinical and laboratory findings.   Fatty infiltrated liver.   Asymmetrically atrophic left kidney.   MACRO: None   Signed by: Dannie Soto 2/15/2025 10:08 PM Dictation workstation:   [id number]    CT head wo IV contrast    Result Date: 2/15/2025  Interpreted By:  Dominik Covington, STUDY: CT HEAD WO IV CONTRAST;  2/15/2025 8:58 pm   INDICATION: Signs/Symptoms:AMS.   COMPARISON: None.   ACCESSION NUMBER(S): [id number]   ORDERING CLINICIAN: DAMON JOSE   TECHNIQUE: Noncontrast axial CT images of head were obtained with coronal and sagittal reconstructed images.   FINDINGS: BRAIN PARENCHYMA: Mild-to-moderate cortical atrophy. No acute intraparenchymal hemorrhage or parenchymal evidence of acute large territory ischemic infarct. No mass-effect. Gray-white matter distinction is preserved.   VENTRICLES and EXTRA-AXIAL SPACES:  No acute extra-axial or intraventricular hemorrhage. No effacement of cerebral sulci. Ventricles and sulci are age-concordant.   PARANASAL SINUSES/MASTOIDS:  No hemorrhage or air-fluid levels within the visualized paranasal sinuses. Postsurgical change with resection of  left mastoid air cells and the left middle ear structures.   CALVARIUM/ORBITS:  No skull fracture. The orbits and globes are intact to the extent visualized.   EXTRACRANIAL SOFT TISSUES: No discernible abnormality.       No acute intracranial abnormality.     MACRO: None.   Signed by: Dominik Covington 2/15/2025 10:00 PM Dictation workstation:   [id number]    XR chest 2 views    Result Date: 2/15/2025  Interpreted By:  Juan Lloyd, STUDY: XR CHEST 2 VIEWS;  2/15/2025 8:35 pm   INDICATION: Signs/Symptoms:cough.     COMPARISON: None.   ACCESSION NUMBER(S): [id number]   ORDERING CLINICIAN: DAMON JOSE   FINDINGS:         CARDIOMEDIASTINAL SILHOUETTE: Cardiomediastinal silhouette is normal in size and configuration.   LUNGS: There is mild bibasilar atelectasis. There is no consolidation or effusion   ABDOMEN: No remarkable upper abdominal findings.   BONES: No acute osseous changes.       1. Mild bibasilar atelectasis       MACRO: None   Signed by: Juan Lloyd 2/15/2025 8:44 PM Dictation workstation:   [id number]           Assessment/Plan   Assessment & Plan  Acute kidney injury superimposed on CKD (CMS-HCC)    Hyperosmolar coma due to type 2 diabetes mellitus (Multi)    Acute pancreatitis    COVID-19    Elevated troponin    Neuro   -Altered mental status   -Multifactorial secondary to shock, COVID, profound volume depletion setting of [facility]   -Sedation with propofol fentanyl as tolerates  ` -CT head negative    Respiratory   -Acute hypoxic respiratory largely driven by aspiration   ` -Patient is intubated    -Check ABG full panel    -Empiric coverage with Rocephin    -Keep SpO2 >92%    Cardiac/hemodynamics   -Undifferentiated shock    -Suspect largely driven by hypovolemic, hemorrhagic shock    -Currently on Levophed, getting resuscitated    -Continue with volume expansion for now pressor support as needed to keep MAP greater than 65    -Lactic acid peak 8.1, currently at 7.5   -Troponin bump, suspect  demand ischemia    -Cardiology is consulted, check echocardiogram    GI   -Coffee-ground emesis    -NG in place to low intermittent suction    -GI consulted appreciate their input    -Continue with PPI 40 mg IV twice daily   -Pancreatitis    -CT abdomen completed done without contrast    -Repeat lipase    -Consult surgery    Renal  -Acute on chronic kidney disease, noted for atrophic kidney  -Nephrology is consulted  -Continue with volume expansion  -Electrolytes are okay  -Add SPEP, kappa lambda ratio, CPK    Endocrine  -[facility]   -Ongoing volume expansion, appreciate endocrine's input  -Serum osmolarity  ID   -COVID, suspect largely aspiration   -Discussed with ID, continue Rocephin    GI/DVT prophylaxis  -PPI, SCD    Discussed with GI, ID, surgical team, primary team    Updated daughter  CODE STATUS DNR CCA okay for intubation    Godwin Alcantara MD  I spent 65 minutes of cumulative critical care time with the patient.  Greater than 50% of that time was spent in the direct collaboration and or coordination of care of the patient.     Dragon dictation software was used to dictate this note and thus there may be minor errors in translation/transcription including garbled speech or misspellings. Please contact for clarification if needed.

## 2025-02-16 NOTE — CONSULTS
Critical Care Medicine Consult      Reason For Consult  DKA, pancreatitis, COVID-19    History Of Present Illness  Марина Lozano is a 84 y.o. female with past medical history of non-insulin-dependent diabetes mellitus type 2, hypertension, hyperlipidemia, throat cancer (2007), GERD without esophagitis, vitamin D deficiency, and CKD presented to [facility] ED with altered mental status. Upon ED workup, temperature 36 °C, heart rate 101, respiratory rate 23, blood pressure 97/69, and SpO2 94% on room air.  ED labs revealed blood glucose 1248, sodium 134, potassium 3.4, chloride 85, bicarbonate 20, anion gap 32, BUN 53, creatinine 2.97, alkaline phosphatase 140, ALT 48, lipase 4361, , high-sensitivity troponin 78>> 87, beta hydroxybutyrate 6.02, WBC 12.8, RBC 6.46, hemoglobin 16.9, hematocrit 54, venous pH 7.26, pCO2 53, HCO3 calculated 23.8, lactate 8.9, and remainder of CBC unremarkable.  Chest x-ray revealed mild bibasilar atelectasis.  CT head negative for acute intracranial abnormality.  CT abdomen and pelvis revealed disproportionate prominence of the pancreatic head, fat and stranding inflammatory changes small amount of fluid surrounding the pancreas, findings suggest acute pancreatitis, fatty infiltrated liver, and asymmetrically atrophic left kidney.  ED interventions included 2 L LR IV fluid bolus, potassium 20 mEq IV x 1, insulin infusion, and admitted patient to ICU for further management and critical care medicine was consulted.    Patient seen and examined in ED bed 21. Patient alert and oriented to self only on exam. Patient very altered on exam and unable to provide any pertinent history for HPI.  Information for HPI obtained through chart review, interview with staff, and interview with family. Daughter present at bedside and states that she and her sister been taking care of the patient over the past week and patient lives alone.  Daughter reports that patient was having upper respiratory  symptoms including cough, nasal congestion, fatigue, decreased appetite, and decreased activity.  She denies any knowledge of sputum production, fevers, or chills.  Reports that symptoms were mostly stable and not getting worse. When family went to check on patient this evening, she was acutley confused with garbled speech and saying things that didn't make sense, and she was half-nude and disheveled. Daughter called EMS and had patient brought to ER for evaluation. Daughter reports that patient lives alone and manages all of maeve own care and medications. Daughter reports that she had had some increased confusion/forgetfulness over past several months such as forgetting to pay some of her bills. Daughter also reports that patient stated that she was going to stop taking all of her medications last week because she was being stubborn and in a mood but to her knowledge she was still taking them. Daughter unsure of what medications patient takes normally as she manages this herself. No current abdominal tenderness, nausea, vomiting, of diarrhea noted. Former smoker and does no use alcohol or drugs.  Patient noted with matted hair on exam.  Daughter reports that patient wears a knitted cap all winter and she was not aware her hair was so matted. Patient follows with Dr. Hsu for endocrinology.       Past Medical History:   Diagnosis Date    Encounter for immunization 03/24/2022    Need for shingles vaccine    Throat cancer (Multi) 04/17/2023     Past Surgical History:   Procedure Laterality Date    OTHER SURGICAL HISTORY  11/19/2019    Leg surgery    OTHER SURGICAL HISTORY  11/19/2019    Hysterectomy    OTHER SURGICAL HISTORY  11/19/2019    Ear surgery     (Not in a hospital admission)    Codeine  Social History     Tobacco Use    Smoking status: Former     Types: Cigarettes    Smokeless tobacco: Never   Vaping Use    Vaping status: Never Used   Substance Use Topics    Alcohol use: Not Currently    Drug use: Never      Family History   Problem Relation Name Age of Onset    Coronary artery disease Father         Scheduled Medications:   heparin (porcine), 5,000 Units, subcutaneous, q8h NISHANT  [START ON 2/16/2025] pantoprazole, 40 mg, oral, Daily before breakfast   Or  [START ON 2/16/2025] pantoprazole, 40 mg, intravenous, Daily before breakfast  [Held by provider] pravastatin, 20 mg, oral, Daily         Continuous Medications:   D5 % and 0.9 % sodium chloride, 150 mL/hr  dextrose 10 % in water (D10W), 150 mL/hr  dextrose 10 % in water (D10W), 150 mL/hr  dextrose 10 % in water (D10W), 150 mL/hr  insulin regular, 0-50 Units/hr  sodium chloride, 250 mL/hr         PRN Medications:   PRN medications: bisacodyl, D5 % and 0.9 % sodium chloride, dextrose 10 % in water (D10W), dextrose 10 % in water (D10W), dextrose 10 % in water (D10W), dextrose, guaiFENesin, insulin regular, melatonin, ondansetron **OR** ondansetron    Review of Systems:  Review of Systems   Constitutional:  Positive for activity change, appetite change and fatigue.   Respiratory:  Positive for cough.    Psychiatric/Behavioral:  Positive for confusion.         Objective   Vitals:  Most Recent:  Vitals:    02/15/25 2200   BP: 147/78   Pulse: 100   Resp: (!) 23   Temp:    SpO2: 96%       24hr Min/Max:  Temp  Min: 36 °C (96.8 °F)  Max: 36 °C (96.8 °F)  Pulse  Min: 100  Max: 101  BP  Min: 97/69  Max: 147/78  Resp  Min: 23  Max: 23  SpO2  Min: 94 %  Max: 96 %    LDA:   External Urinary Catheter Female (Active)   Placement Date/Time: 02/15/25 1954   Placed by: tech mumtaz  Hand Hygiene Completed: Yes  External Catheter Type: Female   Number of days: 0             Physical exam:    Physical Exam  Constitutional:       Appearance: She is ill-appearing.      Comments: Ill-appearing elderly female. Alert and oriented to self only.    HENT:      Head: Normocephalic.      Comments: Matted hair.      Right Ear: Decreased hearing noted.      Left Ear: Decreased hearing noted.       Ears:      Comments: Hard of hearing     Nose: Nose normal.      Mouth/Throat:      Mouth: Mucous membranes are dry.   Eyes:      Extraocular Movements: Extraocular movements intact.      Conjunctiva/sclera: Conjunctivae normal.      Pupils: Pupils are equal, round, and reactive to light.   Cardiovascular:      Rate and Rhythm: Normal rate and regular rhythm.      Pulses: Normal pulses.      Heart sounds: Normal heart sounds. No murmur heard.  Pulmonary:      Effort: Pulmonary effort is normal. No respiratory distress.      Breath sounds: Normal breath sounds.   Abdominal:      General: Bowel sounds are normal. There is no distension.      Palpations: Abdomen is soft.      Tenderness: There is no abdominal tenderness. There is no guarding.   Musculoskeletal:         General: Normal range of motion.      Cervical back: Normal range of motion.      Right lower leg: No edema.      Left lower leg: No edema.   Skin:     General: Skin is warm and dry.      Capillary Refill: Capillary refill takes less than 2 seconds.   Neurological:      General: No focal deficit present.      Mental Status: She is alert. She is disoriented and confused.      Cranial Nerves: Cranial nerves 2-12 are intact.      Sensory: Sensation is intact.      Comments: Alert and oriented to self only. Does not respond to questions appropriately. Moving all extremities equally. No focal or lateralizing neurological deficits appreciated.    Psychiatric:      Comments: Confused, intermittently restless.           Lab/Radiology/Diagnostic Review:  Results for orders placed or performed during the hospital encounter of 02/15/25 (from the past 24 hours)   POCT GLUCOSE   Result Value Ref Range    POCT Glucose >600 (H) 74 - 99 mg/dL   Beta Hydroxybutyrate   Result Value Ref Range    Beta-Hydroxybutyrate 6.02 (H) 0.02 - 0.27 mmol/L   CBC and Auto Differential   Result Value Ref Range    WBC 12.8 (H) 4.4 - 11.3 x10*3/uL    nRBC 0.0 0.0 - 0.0 /100 WBCs    RBC  6.46 (H) 4.00 - 5.20 x10*6/uL    Hemoglobin 16.9 (H) 12.0 - 16.0 g/dL    Hematocrit 54.0 (H) 36.0 - 46.0 %    MCV 84 80 - 100 fL    MCH 26.2 26.0 - 34.0 pg    MCHC 31.3 (L) 32.0 - 36.0 g/dL    RDW 15.4 (H) 11.5 - 14.5 %    Platelets 234 150 - 450 x10*3/uL    Neutrophils % 90.7 40.0 - 80.0 %    Immature Granulocytes %, Automated 0.3 0.0 - 0.9 %    Lymphocytes % 4.9 13.0 - 44.0 %    Monocytes % 3.9 2.0 - 10.0 %    Eosinophils % 0.0 0.0 - 6.0 %    Basophils % 0.2 0.0 - 2.0 %    Neutrophils Absolute 11.65 (H) 1.60 - 5.50 x10*3/uL    Immature Granulocytes Absolute, Automated 0.04 0.00 - 0.50 x10*3/uL    Lymphocytes Absolute 0.63 (L) 0.80 - 3.00 x10*3/uL    Monocytes Absolute 0.50 0.05 - 0.80 x10*3/uL    Eosinophils Absolute 0.00 0.00 - 0.40 x10*3/uL    Basophils Absolute 0.02 0.00 - 0.10 x10*3/uL   Magnesium   Result Value Ref Range    Magnesium 3.29 (H) 1.60 - 2.40 mg/dL   Comprehensive metabolic panel   Result Value Ref Range    Glucose 1,248 (HH) 74 - 99 mg/dL    Sodium 134 (L) 136 - 145 mmol/L    Potassium 3.4 (L) 3.5 - 5.3 mmol/L    Chloride 85 (L) 98 - 107 mmol/L    Bicarbonate 20 (L) 21 - 32 mmol/L    Anion Gap 32 (H) 10 - 20 mmol/L    Urea Nitrogen 53 (H) 6 - 23 mg/dL    Creatinine 2.97 (H) 0.50 - 1.05 mg/dL    eGFR 15 (L) >60 mL/min/1.73m*2    Calcium 10.1 8.6 - 10.3 mg/dL    Albumin 4.0 3.4 - 5.0 g/dL    Alkaline Phosphatase 140 (H) 33 - 136 U/L    Total Protein 8.4 (H) 6.4 - 8.2 g/dL    AST 36 9 - 39 U/L    Bilirubin, Total 0.8 0.0 - 1.2 mg/dL    ALT 48 (H) 7 - 45 U/L   Lipase   Result Value Ref Range    Lipase 4,361 (H) 9 - 82 U/L   Troponin I, High Sensitivity   Result Value Ref Range    Troponin I, High Sensitivity 78 (HH) 0 - 13 ng/L   Blood Gas Venous Full Panel   Result Value Ref Range    POCT pH, Venous 7.26 (L) 7.33 - 7.43 pH    POCT pCO2, Venous 53 (H) 41 - 51 mm Hg    POCT pO2, Venous 36 35 - 45 mm Hg    POCT SO2, Venous 52 45 - 75 %    POCT Oxy Hemoglobin, Venous 51.6 45.0 - 75.0 %    POCT  Hematocrit Calculated, Venous 53.0 (H) 36.0 - 46.0 %    POCT Sodium, Venous 132 (L) 136 - 145 mmol/L    POCT Potassium, Venous 3.7 3.5 - 5.3 mmol/L    POCT Chloride, Venous 88 (L) 98 - 107 mmol/L    POCT Ionized Calicum, Venous 1.23 1.10 - 1.33 mmol/L    POCT Glucose, Venous >685 (HH) 74 - 99 mg/dL    POCT Lactate, Venous 8.9 (HH) 0.4 - 2.0 mmol/L    POCT Base Excess, Venous -4.2 (L) -2.0 - 3.0 mmol/L    POCT HCO3 Calculated, Venous 23.8 22.0 - 26.0 mmol/L    POCT Hemoglobin, Venous 17.6 (H) 12.0 - 16.0 g/dL    POCT Anion Gap, Venous 24.0 10.0 - 25.0 mmol/L    Patient Temperature 37.0 degrees Celsius    FiO2 21 %   Sars-CoV-2 PCR   Result Value Ref Range    Coronavirus 2019, PCR Detected (A) Not Detected   Influenza A, and B PCR   Result Value Ref Range    Flu A Result Not Detected Not Detected    Flu B Result Not Detected Not Detected   Lipid Panel Non-Fasting   Result Value Ref Range    Cholesterol 199 0 - 199 mg/dL    HDL-Cholesterol 45.4 mg/dL    Cholesterol/HDL Ratio 4.4     Non-HDL Cholesterol 154 (H) 0 - 149 mg/dL   Troponin I, High Sensitivity   Result Value Ref Range    Troponin I, High Sensitivity 87 (HH) 0 - 13 ng/L   B-Type Natriuretic Peptide   Result Value Ref Range     (H) 0 - 99 pg/mL     CT abdomen pelvis wo IV contrast    Result Date: 2/15/2025  Interpreted By:  Dannie Soto, STUDY: CT ABDOMEN PELVIS WO IV CONTRAST;  2/15/2025 8:58 pm   INDICATION: Signs/Symptoms:abdomional pain, diffuse, lipase is 4K.     COMPARISON: None   ACCESSION NUMBER(S): [id number]   ORDERING CLINICIAN: DAMON JOSE   TECHNIQUE: CT of the abdomen and pelvis was performed. Contiguous axial images were obtained at 3 mm slice thickness through the abdomen and pelvis. Coronal and sagittal reconstructions at 3 mm slice thickness were performed.  No intravenous contrast was administered.   FINDINGS: Please note that the evaluation of vessels, lymph nodes and organs is limited without intravenous contrast.   LOWER  CHEST: The included lung bases demonstrate hyperinflation. Aortic calcification. Small hiatal hernia. Coronary artery calcification.   ABDOMEN:   LIVER: The liver is diminished in attenuation suggesting fatty infiltration. No definite focal liver lesions within the limits of this unenhanced study.   BILE DUCTS: No bile duct dilatation.   GALLBLADDER: No definite gallstones. No gallbladder wall thickening.   PANCREAS: There is enlargement of the pancreatic head with surrounding fat stranding and inflammatory changes. There is fat stranding surrounding the entire pancreas but especially the pancreatic head and tail. Findings suggest likely acute pancreatitis.   SPLEEN: Unremarkable spleen.   ADRENAL GLANDS: No adrenal masses.   KIDNEYS AND URETERS: Unremarkable right kidney. Atrophic left kidney. No hydronephrosis. No hydroureter.   PELVIS:   BLADDER: Suboptimally distended and evaluated.   REPRODUCTIVE ORGANS: No definite masses.   BOWEL: No evidence of bowel obstruction. Diverticulosis without definite diverticulitis. Unremarkable appendix.   VESSELS: Aortic calcification. No aneurysmal dilatation. Calcification at the origin of both renal arteries.   PERITONEUM/RETROPERITONEUM/LYMPH NODES: No retroperitoneal adenopathy. No ascites.   ABDOMINAL WALL: Tiny fat containing umbilical hernia.   BONES: Discogenic degenerative changes. Vacuum disc at multiple levels.       Limited by lack of IV contrast.   Disproportionate prominence of the pancreatic head. Fat stranding and inflammatory changes small amount of fluid surrounding the pancreas. Findings suggest acute pancreatitis. Correlate with clinical and laboratory findings.   Fatty infiltrated liver.   Asymmetrically atrophic left kidney.   MACRO: None   Signed by: Dannie Soto 2/15/2025 10:08 PM Dictation workstation:   [id number]    CT head wo IV contrast    Result Date: 2/15/2025  Interpreted By:  Dominik Covington, STUDY: CT HEAD WO IV CONTRAST;  2/15/2025 8:58 pm    INDICATION: Signs/Symptoms:AMS.   COMPARISON: None.   ACCESSION NUMBER(S): [id number]   ORDERING CLINICIAN: DAMON JOSE   TECHNIQUE: Noncontrast axial CT images of head were obtained with coronal and sagittal reconstructed images.   FINDINGS: BRAIN PARENCHYMA: Mild-to-moderate cortical atrophy. No acute intraparenchymal hemorrhage or parenchymal evidence of acute large territory ischemic infarct. No mass-effect. Gray-white matter distinction is preserved.   VENTRICLES and EXTRA-AXIAL SPACES:  No acute extra-axial or intraventricular hemorrhage. No effacement of cerebral sulci. Ventricles and sulci are age-concordant.   PARANASAL SINUSES/MASTOIDS:  No hemorrhage or air-fluid levels within the visualized paranasal sinuses. Postsurgical change with resection of left mastoid air cells and the left middle ear structures.   CALVARIUM/ORBITS:  No skull fracture. The orbits and globes are intact to the extent visualized.   EXTRACRANIAL SOFT TISSUES: No discernible abnormality.       No acute intracranial abnormality.     MACRO: None.   Signed by: Dominik Covington 2/15/2025 10:00 PM Dictation workstation:   [id number]    XR chest 2 views    Result Date: 2/15/2025  Interpreted By:  Juan Lloyd, STUDY: XR CHEST 2 VIEWS;  2/15/2025 8:35 pm   INDICATION: Signs/Symptoms:cough.     COMPARISON: None.   ACCESSION NUMBER(S): [id number]   ORDERING CLINICIAN: DAMON JOSE   FINDINGS:         CARDIOMEDIASTINAL SILHOUETTE: Cardiomediastinal silhouette is normal in size and configuration.   LUNGS: There is mild bibasilar atelectasis. There is no consolidation or effusion   ABDOMEN: No remarkable upper abdominal findings.   BONES: No acute osseous changes.       1. Mild bibasilar atelectasis       MACRO: None   Signed by: Juan Lloyd 2/15/2025 8:44 PM Dictation workstation:   [id number]      Assessment/Plan   Assessment & Plan  DKA, type 2, not at goal    HHS without coma in diabetes mellitus type 2  - Presented  with altered mental status  - Noted with decreased appetite, fatigue, and upper respiratory symptoms x1 week  - Blood glucose 1248  -Sodium 134-corrected sodium for hyperglycemia 157  -Chloride 85  -Bicarbonate 20  -Anion gap 32  -BUN 53  -Creatinine 2.97, baseline~1.4  -Lipase 4361  -Beta hydroxybutyrate 6.02  -VBG: pH 7.26, pCO2 53, HCO3 calculated 23.8  -Lactate 8.9  -Calculated osmolality 356  - COVID-19 PCR positive  - WBC 12.8  - CT abdomen/pelvis revealed disproportionate prominence of the pancreatic head, fat and stranding inflammatory changes small amount of fluid surrounding the pancreas, findings suggest acute pancreatitis, fatty infiltrated liver, and asymmetrically atrophic left kidney .   - Chest x-ray revealed mild bibasilar atelectasis  -S/p 2 L LR bolus and initiation of insulin infusion and IV fluids per [facility] protocol  -  Continue IV fluids per [facility] protocol: Half-normal saline at 250 mL/h until blood glucose less than 300 mg/dL, D5 normal saline at 150 mL/h for blood glucose 150 to 300 mg/dL, D10 at 150 mL/h for blood glucose between 70 and 150 mg/dL and blood glucose less than 70 mg/dL.  -Titrate insulin infusion per [facility] protocol  -POCT glucose checks hourly while on insulin infusion  - If blood glucose too high to read on glucometer, send stat random glucose hourly to confirm blood glucose level.  -Hold insulin infusion for potassium level less than 3.3 mEq/L  -Consider changing IV fluids to potassium containing IV fluids if unable to maintain potassium level greater than 3.3 mEq/L while on insulin infusion despite potassium replacement  -Trend BMP, magnesium, and phosphorus every 4 hours while on insulin infusion  -Replete electrolytes as necessary  -N.p.o.  -Hold home metformin, Glucotrol, and Actos  -Add on serum osmolality  -Check UA with reflex microscopic and culture  -Plan to bridge off insulin infusion with resolution of [facility]  - Endocrinology consul, input appreciated.     Dehydration  -  Presented with altered mental status  - Noted with decreased appetite, fatigue, and upper respiratory symptoms x1 week  - Blood glucose 1248  -Sodium 134-corrected sodium for hyperglycemia 157  -Chloride 85  -Bicarbonate 20  -Anion gap 32  -BUN 53  -Creatinine 2.97, baseline~1.4  - Lactate 8.9  -CT abdomen pelvis results as above  -S/p 2 L LR bolus and initiation of insulin infusion in ED  -Continue IV hydration per [facility] protocol  -Continue insulin infusion per [facility] protocol  -POCT glucose checks every hour on insulin infusion  -N.p.o.  -Trend BMP, magnesium, phosphorus every 4 hours while on insulin infusion  -Trend lactate every 4 hours until less than 2  -Monitor intake and output    High anion gap metabolic acidosis, elevated lactic acid  - Presented with altered mental status  - Noted with decreased appetite, fatigue, and upper respiratory symptoms x1 week  - Blood glucose 1248  -Sodium 134-corrected sodium for hyperglycemia 157  -Chloride 85  -Bicarbonate 20  -Anion gap 32  -BUN 53  -Creatinine 2.97, baseline~1.4  -Lipase 4361  -Beta hydroxybutyrate 6.02  -VBG: pH 7.26, pCO2 53, HCO3 calculated 23.8  -Lactate 8.9  -CT abdomen and pelvis results as above  -S/p 2 L LR bolus and initiation of insulin infusion in ED  -Continue IV hydration per [facility] protocol  -Continue insulin infusion per [facility] protocol  -POCT glucose checks every hour on insulin infusion  -N.p.o.  -Trend BMP, magnesium, phosphorus every 4 hours while on insulin infusion  -Trend lactate every 4 hours until less than 2  -Monitor intake and output  -Continuous telemetry monitoring  -GI consult for pancreatitis, input appreciated  -Endocrinology consult, input appreciated    Pseudohyponatremia 2/2 above  - Sodium 134- corrected sodium for hyperglycemia 157  - Blood glucose 1248  - s/p 2L LR bolus in ED  - Continue IV hydration per [facility] protocol  - Continue insulin infusion per [facility] protocol  - Trend BMP every 4 hours while on insulin infusion  - Monitor  intake and output    Pancreatitis  - Lipase 4361  - CT abdomen/pelvis revealed disproportionate prominence of the pancreatic head, fat and stranding inflammatory changes small amount of fluid surrounding the pancreas, findings suggest acute pancreatitis, fatty infiltrated liver, and asymmetrically atrophic left kidney .   - Currently no abdominal pain on exam  - NPO  - Continue IV hydration per [facility] protocol  - Continue insulin infusion per [facility] protocol  - Monitor intake and output  - GI consult, input appreciated.     Acute COVID -19  - Daughter reports patient had cough, nasal congestion, decreased appetite, decreased activity, and fatigue x1 week  - COVID-19 PCR positive  - Chest x-ray revealed mild bibasilar atelectasis  - Currently no respiratory complaints and SPO2 96-98% on room air.   - No treatment indicated for COVID-19 at this time    JAKY on CKD  - Presented with altered mental status and found to be in HHS, COVID-19 positive, and acute pancreatitis  - Creatinine 2.97, baseline creatinine ~1.4  - BUN 53  - CT abdomen/pelvis revealed asymmetrically atrophic left kidney  - S/p 2 L LR bolus in ED  -Hold home metformin, glipizide, Actos, Lotrel, and HCTZ  - Continue IV hydration per [facility] protocol  - Continue insulin infusion per [facility] protocol  - Trend BMP, magnesium, and phosphorus every 4 hours while on insulin infusion  - Replete electrolytes as necessary  - Monitor intake and output  - Consider nephrology consult if no improvement or worsening.     Hypokalemia  - Potassium level 3.4  - S/p 20mEq KCL  - Follow BMP every 4 hours while on insulin infusion  - Replete potassium as necessary  - Continuous telemetry monitoring    Acute metabolic encephalopathy  -Alert and oriented to self only, normal baseline alert and oriented with intermittent forgetfulness  - CT head negative  -Suspect likely secondary to above and should resolve with resolution of HHS  - UA with reflex microscopic pending  - ICU delirium  protocol  -Management of HHS as above  - Avoid sedating medications as able  - Consider neurology consult if no improvement after resolution of above.     Elevated troponin  -HSTI 78>> 87  -Denies chest pain or anginal equivalents  -Suspect likely 2/2 to demand in setting of above  -Trend high-sensitivity troponin every 4 hours until peak  -Continuous telemetry monitoring  -ECG as needed for ACS symptoms  -Consider cardiology consult if develops ACS symptoms    Hypertension  -Hold home Lotrel and HCTZ given n.p.o. status  -Resume home antihypertensives as appropriate  -Consider as needed IV antihypertensives while n.p.o. to maintain systolic blood pressure less than 160 mmHg.  -Blood pressure currently controlled    Hyperlipidemia  - Hold home statin while NPO for acute pancreatitis.  - Resume statin as appropriate.     DVT prophylaxis  - Heparin subcutaneously     Advanced care planning  - Patient alert and oriented to self only and does not have capacity to make medical decisions at this time. Discussed with patient's daughter at bedside about code status and goals of care. Explained differenced between Full code and DNR. Daughter stated that she will discuss with her sister and indicated that they will likely seek to change code status to DNR/DNI after discussion. Follow-up on this later during the stay after family discusses with eachother.   ADDENDUM 2/16/25 at 0120: Notified by bedside nurse family discussed code status and are in agreement to change code status to DNR but ok for mechanical ventilation. Discussed with patient's daughter, Kerrie at bedside and she reports that she spoke with her sister, Breonna and they would like to change code status to DNR and ok for mechanical ventilation. Will change code status to DNRCCA ok for mechanical ventilation as per family's wishes. Explained to Kerrie that if patient and family would like to continue DNR status after discharge that they will need signed [place] DNR.  Kerrie stated understanding.     I spent 65 minutes of cumulative critical care time with the patient.  Greater than 50% of that time was spent in the direct collaboration and or coordination of care of the patient.     Dragon dictation software was used to dictate this note and thus there may be minor errors in translation/transcription including garbled speech or misspellings. Please contact for clarification if needed.

## 2025-02-16 NOTE — CARE PLAN
Problem: Safety - Medical Restraint  Goal: Remains free of injury from restraints (Restraint for Interference with Medical Device)  2/16/2025 0652 by Queta Laird RN  Outcome: Progressing  Flowsheets (Taken 2/16/2025 0652)  Remains free of injury from restraints (restraint for interference with medical device): Every 2 hours: Monitor safety, psychosocial status, comfort, nutrition and hydration  2/16/2025 0651 by Queta Laird RN  Outcome: Progressing  Goal: Free from restraint(s) (Restraint for Interference with Medical Device)  2/16/2025 0652 by Queta Laird RN  Outcome: Progressing      Problem: Pain - Adult  Goal: Verbalizes/displays adequate comfort level or baseline comfort level  2/16/2025 0652 by Queta Laird RN  Outcome: Progressing       Problem: Safety - Adult  Goal: Free from fall injury  2/16/2025 0652 by Queta Laird RN  Outcome: Progressing    Problem: Chronic Conditions and Co-morbidities  Goal: Patient's chronic conditions and co-morbidity symptoms are monitored and maintained or improved  2/16/2025 0652 by Queta Laird RN  Outcome: Progressing    Problem: Nutrition  Goal: Nutrient intake appropriate for maintaining nutritional needs  2/16/2025 0652 by Queta Laird RN  Outcome: Progressing       Problem: Skin  Goal: Decreased wound size/increased tissue granulation at next dressing change  2/16/2025 0652 by Queta Laird RN  Outcome: Progressing    Goal: Participates in plan/prevention/treatment measures  2/16/2025 0652 by Queta Laird RN  Outcome: Progressing    Goal: Prevent/manage excess moisture  2/16/2025 0652 by Queta Laird RN  Outcome: Progressing    Goal: Prevent/minimize sheer/friction injuries  2/16/2025 0652 by Queta Laird RN  Outcome: Progressing  Flowsheets (Taken 2/16/2025 0652)  Prevent/minimize sheer/friction injuries: Turn/reposition every 2 hours/use positioning/transfer devices  2/16/2025 0651 by Queta Laird RN  Outcome: Progressing  Goal:  Promote/optimize nutrition  2/16/2025 0652 by Queta Laird RN  Outcome: Progressing    Goal: Promote skin healing  2/16/2025 0652 by Queta Laird RN  Outcome: Progressing       Problem: Diabetes  Goal: Achieve decreasing blood glucose levels by end of shift  2/16/2025 0652 by Queta Laird RN  Outcome: Progressing    Goal: Increase stability of blood glucose readings by end of shift  2/16/2025 0652 by Queta Laird RN  Outcome: Progressing    Goal: Decrease in ketones present in urine by end of shift  2/16/2025 0652 by Queta Laird RN  Outcome: Progressing    Goal: Maintain electrolyte levels within acceptable range throughout shift  2/16/2025 0652 by Queta Laird RN  Outcome: Progressing    Goal: Maintain glucose levels >70mg/dl to <250mg/dl throughout shift  2/16/2025 0652 by Queta Laird RN  Outcome: Progressing    Goal: No changes in neurological exam by end of shift  2/16/2025 0652 by Queta Laird RN  Outcome: Progressing    Goal: Learn about and adhere to nutrition recommendations by end of shift  2/16/2025 0652 by Queta Laird RN  Outcome: Progressing    Goal: Vital signs within normal range for age by end of shift  2/16/2025 0652 by Queta Laird RN  Outcome: Progressing    Goal: Increase self care and/or family involovement by end of shift  2/16/2025 0652 by Queta Laird RN  Outcome: Progressing    Goal: Receive DSME education by end of shift  2/16/2025 0652 by Queta Laird RN  Outcome: Progressing          Problem: Discharge Planning  Goal: Discharge to home or other facility with appropriate resources  2/16/2025 0652 by Queta Laird RN  Outcome: Progressing

## 2025-02-16 NOTE — CONSULTS
Inpatient consult to Cardiology  Consult performed by: Fred Salamanca MD  Consult ordered by: Flor Doyle, BRIE-CNP        History Of Present Illness:      History is obtained from patient's chart and discussion with other providers.    Марина Lozano is a 84 y.o. female with past medical history of diabetes mellitus, hypertension, hyperlipidemia, throat cancer, GERD, CKD who presented to the hospital due to altered mental status.  Patient was noted to be hypothermic, tachycardic and tachypneic on arrival and and hypotension.     Cardiac enzymes elevated at 78> 87> 89> 102>133    BNP abnormal at 127.  Patient has JAKY on CKD with a creatinine of 2.97 on arrival.    Patient in DKA with a glucose of 1248 and anion gap of 32    Lactate elevated and uptrending.    Leukocytosis of 12.8 on arrival.    Thrombocytopenia with a platelet of 131.  Hemoglobin stable.    No EKG available in system.    Serum lipase was elevated.  Abnormal LFTs.    CT head was unremarkable for acute intracranial abnormalities    CT abdomen and pelvis revealed disproportionate prominence of the pancreatic head, fat and stranding inflammatory changes small amount of fluid surrounding the pancreas, findings suggest acute pancreatitis, fatty infiltrated liver, and asymmetrically atrophic left kidney     Daughter reports that patient was having upper respiratory symptoms including cough, nasal congestion, fatigue, decreased appetite, and decreased activity.     COVID-19 infection is positive.    Cardiology consulted for elevated troponin.    Patient had coffee ground emesis followed this patient was intubated.    Last Recorded Vitals:  Vitals:    02/16/25 0700 02/16/25 0800 02/16/25 0900 02/16/25 1000   BP: 118/69 116/67 98/65    BP Location:       Patient Position:       Pulse: 101 90 96 87   Resp: 25 (!) 57 (!) 37 13   Temp: 36.4 °C (97.5 °F)      TempSrc: Temporal      SpO2: 97% 95% 96% (!) 82%   Weight:       Height:           Last Labs:  CBC  - 2/16/2025: 10:31 AM  12.9 12.9 131    40.8      CMP - 2/16/2025: 10:03 AM  8.6 6.4 54 --- 0.6   3.0 3.0 43 113      PTT - No results in last year.  _   _ _     Troponin I, High Sensitivity   Date/Time Value Ref Range Status   02/16/2025 08:56  (HH) 0 - 13 ng/L Final     Comment:     Previous result verified on 2/15/2025 2009 on specimen/case [id number] called with component TRPHS for procedure Troponin I, High Sensitivity with value 78 ng/L.   02/16/2025 04:11  (HH) 0 - 13 ng/L Final     Comment:     Previous result verified on 2/15/2025 2009 on specimen/case [id number] called with component TRPHS for procedure Troponin I, High Sensitivity with value 78 ng/L.   02/16/2025 12:27 AM 89 (HH) 0 - 13 ng/L Final     Comment:     Previous result verified on 2/15/2025 2009 on specimen/case [id number] called with component TRPHS for procedure Troponin I, High Sensitivity with value 78 ng/L.     BNP   Date/Time Value Ref Range Status   02/15/2025 08:18  (H) 0 - 99 pg/mL Final     Hemoglobin A1C   Date/Time Value Ref Range Status   06/13/2023 09:44 AM 7.0 (A) % Final     Comment:          Diagnosis of Diabetes-Adults   Non-Diabetic: < or = 5.6%   Increased risk for developing diabetes: 5.7-6.4%   Diagnostic of diabetes: > or = 6.5%  .       Monitoring of Diabetes                Age (y)     Therapeutic Goal (%)   Adults:          >18           <7.0   Pediatrics:    13-18           <7.5                   7-12           <8.0                   0- 6            7.5-8.5   American Diabetes Association. Diabetes Care 33(S1), Jan 2010.   05/31/2022 09:15 AM 7.1 (A) % Final     Comment:          Diagnosis of Diabetes-Adults   Non-Diabetic: < or = 5.6%   Increased risk for developing diabetes: 5.7-6.4%   Diagnostic of diabetes: > or = 6.5%  .       Monitoring of Diabetes                Age (y)     Therapeutic Goal (%)   Adults:          >18           <7.0   Pediatrics:    13-18           <7.5             "       7-12           <8.0                   0- 6            7.5-8.5   American Diabetes Association. Diabetes Care 33(S1), Jan 2010.       VLDL   Date/Time Value Ref Range Status   06/13/2023 09:44 AM 31 0 - 40 mg/dL Final   05/31/2022 09:15 AM 29 0 - 40 mg/dL Final   09/03/2021 09:07 AM 21 0 - 40 mg/dL Final      Last I/O:  I/O last 3 completed shifts:  In: 7 (0.1 mL/kg) [I.V.:7 (0.1 mL/kg)]  Out: - (0 mL/kg)   Weight: 69.7 kg     Past Cardiology Tests (Last 3 Years):  EKG:  No results found for this or any previous visit from the past 1095 days.    Echo:  No results found for this or any previous visit from the past 1095 days.    Ejection Fractions:  No results found for: \"EF\"  Cath:  No results found for this or any previous visit from the past 1095 days.    Stress Test:  No results found for this or any previous visit from the past 1095 days.    Cardiac Imaging:  No results found for this or any previous visit from the past 1095 days.      Past Medical History:  She has a past medical history of Encounter for immunization (03/24/2022) and Throat cancer (Multi) (04/17/2023).    Past Surgical History:  She has a past surgical history that includes Other surgical history (11/19/2019); Other surgical history (11/19/2019); and Other surgical history (11/19/2019).      Social History:  She reports that she has quit smoking. Her smoking use included cigarettes. She has never used smokeless tobacco. She reports that she does not currently use alcohol. She reports that she does not use drugs.    Family History:  Family History   Problem Relation Name Age of Onset    Coronary artery disease Father          Allergies:  Codeine    Inpatient Medications:  Scheduled medications   Medication Dose Route Frequency    cefTRIAXone  1 g intravenous q24h    heparin (porcine)  5,000 Units subcutaneous q8h NISHANT    pantoprazole  40 mg oral Daily before breakfast    Or    pantoprazole  40 mg intravenous Daily before breakfast    [Held " by provider] pravastatin  20 mg oral Daily     PRN medications   Medication    bisacodyl    dextrose 10 % in water (D10W)    dextrose 10 % in water (D10W)    dextrose 10 % in water (D10W)    dextrose    guaiFENesin    insulin regular    melatonin    ondansetron    Or    ondansetron     Continuous Medications   Medication Dose Last Rate    dextrose 10 % in water (D10W)  150 mL/hr      dextrose 10 % in water (D10W)  150 mL/hr      dextrose 10 % in water (D10W)  150 mL/hr      fentaNYL  0-200 mcg/hr      insulin regular  0-50 Units/hr 5.6 Units/hr (02/16/25 1027)    propofol  0-20 mcg/kg/min      potassium chloride-0.45 % NaCl  150 mL/hr 150 mL/hr (02/16/25 0359)     Outpatient Medications:  Current Outpatient Medications   Medication Instructions    amLODIPine-benazepriL (LotreL) 10-20 mg capsule 1 capsule, oral, Daily    cholecalciferol (Vitamin D-3) 50 MCG (2000 UT) tablet 1 tablet, Every other day    glipiZIDE (GLUCOTROL) 5 mg, oral, 2 times daily    hydroCHLOROthiazide (MICROZIDE) 12.5 mg, oral, Daily    metFORMIN (GLUCOPHAGE) 500 mg, oral, 2 times daily (morning and late afternoon)    multivitamin-Ca-iron-minerals (Tab-A-Verna Womens) 27-0.4 mg tablet 1 tablet, Daily    omeprazole (PRILOSEC) 20 mg, oral, Daily before breakfast, DO NOT CUSH OR CHEW    OneTouch Delica Plus Lancet 33 gauge misc 1 each, intradermal, 2 times daily    OneTouch Verio Flex meter misc CHECK SUGARS THREE TIMES DAILY    OneTouch Verio test strips strip 1 each, intradermal, 2 times daily    pioglitazone (ACTOS) 15 mg, oral, Daily    pravastatin (PRAVACHOL) 20 mg, oral, Daily    vitamin E acid succinate (vitamin E succinate) 67 mg (100 unit) tablet Take by mouth.       Physical Exam:  General: Intubated, sedated.  Head: Atraumatic, no obvious abnormality  Neck: Supple, no midline mass or swelling.  ET tube in place   Lungs: Intubated, ventilator breath sounds   Heart: Regular rhythm, no murmur appreciated  Abdomen: NG tube/OG tube in  place,  Extremities: No edema, no cyanosis, cold extremities  Skin: Cold extremities, No rashes   Neurologic: Intubated, sedated       Assessment/Plan   DKA/HHS  Acute pancreatitis  Abnormal troponin elevation  COVID-19 infection  JAKY on CKD  Electrolyte imbalance  Metabolic encephalopathy  Coffee-ground emesis    Plan:  -Suspect cardiac enzyme elevation is type II MI in setting of acute illnesses including DKA/HHS/COVID infection/JAKY on CKD.  -Obtain TTE to evaluate for any major structural normalities.  -Will recommend aspirin 81 mg daily when able to be started.  -Supportive care/management of acute illness as per primary team.    Discussed with ICU team    Call cardiology if any questions.      Peripheral IV 02/15/25 20 G Right Antecubital (Active)   Site Assessment Clean;Dry;Intact 02/16/25 0900   Dressing Status Clean;Dry 02/16/25 0900   Number of days: 1       Peripheral IV 02/15/25 22 G Left;Anterior Forearm (Active)   Site Assessment Clean;Dry;Intact 02/16/25 0900   Dressing Status Clean;Dry 02/16/25 0900   Number of days: 1       Peripheral IV 02/16/25 22 G Right;Anterior Forearm (Active)   Site Assessment Clean;Dry;Intact 02/16/25 0900   Dressing Status Clean;Dry 02/16/25 0900   Number of days: 0       Urethral Catheter 16 Fr. (Active)   Collection Container Standard drainage bag 02/16/25 0330   Securement Method Securing device (Describe) 02/16/25 0330   Reason for Continuing Urinary Catheterization acute urinary retention 02/16/25 0330   Number of days: 0       Code Status:  DNR    I spent 35 minutes in the professional and overall care of this patient.        Fred Salamanca MD

## 2025-02-16 NOTE — PROCEDURES
Central Line    Date/Time: 2/16/2025 12:10 PM    Performed by: Godwin Alcantara MD  Authorized by: Godwin Alcantara MD    Consent:     Consent obtained:  Verbal    Consent given by: patient's daughter.    Risks, benefits, and alternatives were discussed: yes    Universal protocol:     Patient identity confirmed:  Arm band  Pre-procedure details:     Indication(s): central venous access and hemodynamic monitoring      Skin preparation:  Chlorhexidine  Sedation:     Sedation type:  Moderate sedation  Procedure details:     Location:  R internal jugular    Patient position:  Trendelenburg    Procedural supplies:  Triple lumen    Catheter size:  7 Fr    Landmarks identified: yes      Ultrasound guidance: yes      Ultrasound guidance timing: prior to insertion and real time      Sterile ultrasound techniques: Sterile gel and sterile probe covers were used      Number of attempts:  1    Successful placement: yes    Post-procedure details:     Post-procedure:  Dressing applied and line sutured    Assessment:  Blood return through all ports    Procedure completion:  Tolerated well, no immediate complications  Comments:      Cxr ordered, pending

## 2025-02-16 NOTE — CONSULTS
Inpatient consult to Endocrinology  Consult performed by: Андрей Hsu MD  Consult ordered by: Bridger Denton DO  Reason for consult: [facility]        Reason For Consult  [facility]    History Of Present Illness  Марина Lozano is a 84 y.o. female presenting with   presenting with altered mental status. The patient is currently intubated and sedated and thus history is obtained via EMR.  She reportedly had respiratory symptoms, fatigue, cough and congestion for one week prior to admission.  Her oral intake has been poor.   She was found to be acutely confused, disheveled and with garbled speech as per her daughter.   She was found to be hypotensive and tachycardic.  Initial lab data revealed a glucose value of 1248mg/dL, bicarbonate of 20, anion gap of 32, BUN 53, Cr 2.97, lipase 4361, beta-hydroxybutyrate of 6.02 and leucocytosis.  Testing was negative for influenza and COVID.  CXR revealed mild bibasilar atelectasis.  CT scan of the abdomen and pelvis revealed disproportionate prominence of the pancreatic head. Fat stranding and inflammatory changes small amount of fluid surrounding the pancreas.  Findings can suggest acute pancreatitis.  Fatty infiltrated liver.  CT scan of the head revealed no acute intracranial abnormality. She was started on IVF and an insulin infusion with admittance to the ICU.    The patient is now intubated.  She had a large amount of coffee ground emesis.      The patient was last seen in the outpatient setting in October 2024.      Home medication regimen  Glipizide 5mg twice daily  Metformin 500mg twice daily  Actos 15mg once daily     Historically, her A1C values have been in the 7% range.    She is on an insulin drip at 7 units per hour.  She is getting a NS bolus.  She was started on pressor therapy given declining blood pressers.       Past Medical History  Type 2 diabetes mellitus, HTN, HLD, throat cancer (2007)     Surgical History  She has a past surgical history that includes  "Other surgical history (11/19/2019); Other surgical history (11/19/2019); and Other surgical history (11/19/2019).     Social History  She reports that she has quit smoking. Her smoking use included cigarettes. She has never used smokeless tobacco. She reports that she does not currently use alcohol. She reports that she does not use drugs.    Family History  Family History   Problem Relation Name Age of Onset    Coronary artery disease Father          Allergies  Codeine    Review of Systems   Reason unable to perform ROS: Patient intubated and sedated.        Physical Exam  Vitals and nursing note reviewed.   Constitutional:       General: She is not in acute distress.     Appearance: Normal appearance. She is normal weight.   HENT:      Head: Normocephalic and atraumatic.      Nose: Nose normal.      Mouth/Throat:      Mouth: Mucous membranes are moist.   Eyes:      Extraocular Movements: Extraocular movements intact.   Cardiovascular:      Rate and Rhythm: Regular rhythm. Tachycardia present.   Pulmonary:      Effort: Pulmonary effort is normal.      Comments: ET tube in situ   Genitourinary:     Comments: Lomeli catheter in situ   Musculoskeletal:         General: Normal range of motion.      Comments: Matted hair   Neurological:      Mental Status: She is alert.        Last Recorded Vitals  Blood pressure 98/65, pulse 87, temperature 36.4 °C (97.5 °F), temperature source Temporal, resp. rate 13, height 1.676 m (5' 6\"), weight 69.7 kg (153 lb 10.6 oz), SpO2 (!) 82%.    Relevant Results  Scheduled medications  albumin human, , ,   cefTRIAXone, 1 g, intravenous, q24h  etomidate, , ,   heparin (porcine), 5,000 Units, subcutaneous, q8h NISHANT  pantoprazole, 40 mg, oral, Daily before breakfast   Or  pantoprazole, 40 mg, intravenous, Daily before breakfast  [Held by provider] pravastatin, 20 mg, oral, Daily  rocuronium, , ,       Continuous medications  D5 % and 0.9 % sodium chloride, 150 mL/hr  dextrose 10 % in water " (D10W), 150 mL/hr  dextrose 10 % in water (D10W), 150 mL/hr  dextrose 10 % in water (D10W), 150 mL/hr  insulin regular, 0-50 Units/hr, Last Rate: 4.5 Units/hr (02/16/25 0735)  potassium chloride-0.45 % NaCl, 150 mL/hr, Last Rate: 150 mL/hr (02/16/25 0359)      PRN medications  PRN medications: albumin human, bisacodyl, D5 % and 0.9 % sodium chloride, dextrose 10 % in water (D10W), dextrose 10 % in water (D10W), dextrose 10 % in water (D10W), dextrose, etomidate, guaiFENesin, insulin regular, melatonin, ondansetron **OR** ondansetron, rocuronium    Results for orders placed or performed during the hospital encounter of 02/15/25 (from the past 96 hours)   POCT GLUCOSE   Result Value Ref Range    POCT Glucose >600 (H) 74 - 99 mg/dL   Beta Hydroxybutyrate   Result Value Ref Range    Beta-Hydroxybutyrate 6.02 (H) 0.02 - 0.27 mmol/L   CBC and Auto Differential   Result Value Ref Range    WBC 12.8 (H) 4.4 - 11.3 x10*3/uL    nRBC 0.0 0.0 - 0.0 /100 WBCs    RBC 6.46 (H) 4.00 - 5.20 x10*6/uL    Hemoglobin 16.9 (H) 12.0 - 16.0 g/dL    Hematocrit 54.0 (H) 36.0 - 46.0 %    MCV 84 80 - 100 fL    MCH 26.2 26.0 - 34.0 pg    MCHC 31.3 (L) 32.0 - 36.0 g/dL    RDW 15.4 (H) 11.5 - 14.5 %    Platelets 234 150 - 450 x10*3/uL    Neutrophils % 90.7 40.0 - 80.0 %    Immature Granulocytes %, Automated 0.3 0.0 - 0.9 %    Lymphocytes % 4.9 13.0 - 44.0 %    Monocytes % 3.9 2.0 - 10.0 %    Eosinophils % 0.0 0.0 - 6.0 %    Basophils % 0.2 0.0 - 2.0 %    Neutrophils Absolute 11.65 (H) 1.60 - 5.50 x10*3/uL    Immature Granulocytes Absolute, Automated 0.04 0.00 - 0.50 x10*3/uL    Lymphocytes Absolute 0.63 (L) 0.80 - 3.00 x10*3/uL    Monocytes Absolute 0.50 0.05 - 0.80 x10*3/uL    Eosinophils Absolute 0.00 0.00 - 0.40 x10*3/uL    Basophils Absolute 0.02 0.00 - 0.10 x10*3/uL   Magnesium   Result Value Ref Range    Magnesium 3.29 (H) 1.60 - 2.40 mg/dL   Comprehensive metabolic panel   Result Value Ref Range    Glucose 1,248 (HH) 74 - 99 mg/dL    Sodium  134 (L) 136 - 145 mmol/L    Potassium 3.4 (L) 3.5 - 5.3 mmol/L    Chloride 85 (L) 98 - 107 mmol/L    Bicarbonate 20 (L) 21 - 32 mmol/L    Anion Gap 32 (H) 10 - 20 mmol/L    Urea Nitrogen 53 (H) 6 - 23 mg/dL    Creatinine 2.97 (H) 0.50 - 1.05 mg/dL    eGFR 15 (L) >60 mL/min/1.73m*2    Calcium 10.1 8.6 - 10.3 mg/dL    Albumin 4.0 3.4 - 5.0 g/dL    Alkaline Phosphatase 140 (H) 33 - 136 U/L    Total Protein 8.4 (H) 6.4 - 8.2 g/dL    AST 36 9 - 39 U/L    Bilirubin, Total 0.8 0.0 - 1.2 mg/dL    ALT 48 (H) 7 - 45 U/L   Lipase   Result Value Ref Range    Lipase 4,361 (H) 9 - 82 U/L   Troponin I, High Sensitivity   Result Value Ref Range    Troponin I, High Sensitivity 78 (HH) 0 - 13 ng/L   Blood Gas Venous Full Panel   Result Value Ref Range    POCT pH, Venous 7.26 (L) 7.33 - 7.43 pH    POCT pCO2, Venous 53 (H) 41 - 51 mm Hg    POCT pO2, Venous 36 35 - 45 mm Hg    POCT SO2, Venous 52 45 - 75 %    POCT Oxy Hemoglobin, Venous 51.6 45.0 - 75.0 %    POCT Hematocrit Calculated, Venous 53.0 (H) 36.0 - 46.0 %    POCT Sodium, Venous 132 (L) 136 - 145 mmol/L    POCT Potassium, Venous 3.7 3.5 - 5.3 mmol/L    POCT Chloride, Venous 88 (L) 98 - 107 mmol/L    POCT Ionized Calicum, Venous 1.23 1.10 - 1.33 mmol/L    POCT Glucose, Venous >685 (HH) 74 - 99 mg/dL    POCT Lactate, Venous 8.9 (HH) 0.4 - 2.0 mmol/L    POCT Base Excess, Venous -4.2 (L) -2.0 - 3.0 mmol/L    POCT HCO3 Calculated, Venous 23.8 22.0 - 26.0 mmol/L    POCT Hemoglobin, Venous 17.6 (H) 12.0 - 16.0 g/dL    POCT Anion Gap, Venous 24.0 10.0 - 25.0 mmol/L    Patient Temperature 37.0 degrees Celsius    FiO2 21 %   Sars-CoV-2 PCR   Result Value Ref Range    Coronavirus 2019, PCR Detected (A) Not Detected   Influenza A, and B PCR   Result Value Ref Range    Flu A Result Not Detected Not Detected    Flu B Result Not Detected Not Detected   Lipid Panel Non-Fasting   Result Value Ref Range    Cholesterol 199 0 - 199 mg/dL    HDL-Cholesterol 45.4 mg/dL    Cholesterol/HDL Ratio 4.4      Non-HDL Cholesterol 154 (H) 0 - 149 mg/dL   Triglycerides   Result Value Ref Range    Triglycerides 372 (H) 0 - 149 mg/dL   Troponin I, High Sensitivity   Result Value Ref Range    Troponin I, High Sensitivity 87 (HH) 0 - 13 ng/L   B-Type Natriuretic Peptide   Result Value Ref Range     (H) 0 - 99 pg/mL   Osmolality   Result Value Ref Range    Osmolality, Serum 390 (H) 280 - 300 mOsm/kg   Basic metabolic panel   Result Value Ref Range    Glucose 1,035 (HH) 74 - 99 mg/dL    Sodium 137 136 - 145 mmol/L    Potassium 3.0 (L) 3.5 - 5.3 mmol/L    Chloride 92 (L) 98 - 107 mmol/L    Bicarbonate 21 21 - 32 mmol/L    Anion Gap 27 (H) 10 - 20 mmol/L    Urea Nitrogen 54 (H) 6 - 23 mg/dL    Creatinine 2.98 (H) 0.50 - 1.05 mg/dL    eGFR 15 (L) >60 mL/min/1.73m*2    Calcium 9.2 8.6 - 10.3 mg/dL   Magnesium   Result Value Ref Range    Magnesium 2.74 (H) 1.60 - 2.40 mg/dL   Phosphorus   Result Value Ref Range    Phosphorus 4.0 2.5 - 4.9 mg/dL   Lactate   Result Value Ref Range    Lactate 6.6 (HH) 0.4 - 2.0 mmol/L   Troponin I, High Sensitivity   Result Value Ref Range    Troponin I, High Sensitivity 89 (HH) 0 - 13 ng/L   POCT GLUCOSE   Result Value Ref Range    POCT Glucose >600 (H) 74 - 99 mg/dL   Lactate   Result Value Ref Range    Lactate 5.5 (HH) 0.4 - 2.0 mmol/L   Basic Metabolic Panel   Result Value Ref Range    Glucose 998 (HH) 74 - 99 mg/dL    Sodium 132 (L) 136 - 145 mmol/L    Potassium 4.6 3.5 - 5.3 mmol/L    Chloride 94 (L) 98 - 107 mmol/L    Bicarbonate 16 (L) 21 - 32 mmol/L    Anion Gap 27 (H) 10 - 20 mmol/L    Urea Nitrogen 55 (H) 6 - 23 mg/dL    Creatinine 3.03 (H) 0.50 - 1.05 mg/dL    eGFR 15 (L) >60 mL/min/1.73m*2    Calcium 8.8 8.6 - 10.3 mg/dL   POCT GLUCOSE   Result Value Ref Range    POCT Glucose >600 (H) 74 - 99 mg/dL   Urinalysis with Reflex Culture and Microscopic   Result Value Ref Range    Color, Urine Light-Yellow Light-Yellow, Yellow, Dark-Yellow    Appearance, Urine Turbid (N) Clear    Specific  Gravity, Urine 1.025 1.005 - 1.035    pH, Urine 5.0 5.0, 5.5, 6.0, 6.5, 7.0, 7.5, 8.0    Protein, Urine 20 (TRACE) NEGATIVE, 10 (TRACE), 20 (TRACE) mg/dL    Glucose, Urine OVER (4+) (A) Normal mg/dL    Blood, Urine NEGATIVE NEGATIVE mg/dL    Ketones, Urine NEGATIVE NEGATIVE mg/dL    Bilirubin, Urine NEGATIVE NEGATIVE mg/dL    Urobilinogen, Urine Normal Normal mg/dL    Nitrite, Urine NEGATIVE NEGATIVE    Leukocyte Esterase, Urine 250 Lynda/uL (A) NEGATIVE   Microscopic Only, Urine   Result Value Ref Range    WBC, Urine 6-10 (A) 1-5, NONE /HPF    WBC Clumps, Urine OCCASIONAL Reference range not established. /HPF    RBC, Urine 6-10 (A) NONE, 1-2, 3-5 /HPF    Squamous Epithelial Cells, Urine 1-9 (SPARSE) Reference range not established. /HPF    Bacteria, Urine 1+ (A) NONE SEEN /HPF    Mucus, Urine FEW Reference range not established. /LPF    Hyaline Casts, Urine OCCASIONAL (A) NONE /LPF    Amorphous Crystals, Urine 1+ NONE, 1+, 2+ /HPF   Lactate   Result Value Ref Range    Lactate 5.0 (HH) 0.4 - 2.0 mmol/L   Troponin I, High Sensitivity   Result Value Ref Range    Troponin I, High Sensitivity 102 (HH) 0 - 13 ng/L   Glucose, random   Result Value Ref Range    Glucose 990 (HH) 74 - 99 mg/dL   POCT GLUCOSE   Result Value Ref Range    POCT Glucose >600 (H) 74 - 99 mg/dL   CBC and Auto Differential   Result Value Ref Range    WBC 12.9 (H) 4.4 - 11.3 x10*3/uL    nRBC 0.0 0.0 - 0.0 /100 WBCs    RBC 5.88 (H) 4.00 - 5.20 x10*6/uL    Hemoglobin 15.7 12.0 - 16.0 g/dL    Hematocrit 49.3 (H) 36.0 - 46.0 %    MCV 84 80 - 100 fL    MCH 26.7 26.0 - 34.0 pg    MCHC 31.8 (L) 32.0 - 36.0 g/dL    RDW 14.6 (H) 11.5 - 14.5 %    Platelets 131 (L) 150 - 450 x10*3/uL    Neutrophils % 90.8 40.0 - 80.0 %    Immature Granulocytes %, Automated 0.5 0.0 - 0.9 %    Lymphocytes % 5.2 13.0 - 44.0 %    Monocytes % 1.8 2.0 - 10.0 %    Eosinophils % 1.4 0.0 - 6.0 %    Basophils % 0.3 0.0 - 2.0 %    Neutrophils Absolute 11.66 (H) 1.60 - 5.50 x10*3/uL     Immature Granulocytes Absolute, Automated 0.07 0.00 - 0.50 x10*3/uL    Lymphocytes Absolute 0.67 (L) 0.80 - 3.00 x10*3/uL    Monocytes Absolute 0.23 0.05 - 0.80 x10*3/uL    Eosinophils Absolute 0.18 0.00 - 0.40 x10*3/uL    Basophils Absolute 0.04 0.00 - 0.10 x10*3/uL   Blood Culture    Specimen: Peripheral Venipuncture; Blood culture   Result Value Ref Range    Blood Culture Loaded on Instrument - Culture in progress    Blood Culture    Specimen: Peripheral Venipuncture; Blood culture   Result Value Ref Range    Blood Culture Loaded on Instrument - Culture in progress    Hepatic Function Panel   Result Value Ref Range    Albumin 3.0 (L) 3.4 - 5.0 g/dL    Bilirubin, Total 0.6 0.0 - 1.2 mg/dL    Bilirubin, Direct 0.2 0.0 - 0.3 mg/dL    Alkaline Phosphatase 113 33 - 136 U/L    ALT 43 7 - 45 U/L    AST 54 (H) 9 - 39 U/L    Total Protein 6.4 6.4 - 8.2 g/dL   Basic metabolic panel   Result Value Ref Range    Glucose 899 (HH) 74 - 99 mg/dL    Sodium 137 136 - 145 mmol/L    Potassium 3.9 3.5 - 5.3 mmol/L    Chloride 94 (L) 98 - 107 mmol/L    Bicarbonate 20 (L) 21 - 32 mmol/L    Anion Gap 27 (H) 10 - 20 mmol/L    Urea Nitrogen 56 (H) 6 - 23 mg/dL    Creatinine 3.23 (H) 0.50 - 1.05 mg/dL    eGFR 13 (L) >60 mL/min/1.73m*2    Calcium 8.9 8.6 - 10.3 mg/dL   Magnesium   Result Value Ref Range    Magnesium 2.59 (H) 1.60 - 2.40 mg/dL   Phosphorus   Result Value Ref Range    Phosphorus 3.2 2.5 - 4.9 mg/dL   POCT GLUCOSE   Result Value Ref Range    POCT Glucose >600 (H) 74 - 99 mg/dL   Lactate   Result Value Ref Range    Lactate 7.5 (HH) 0.4 - 2.0 mmol/L   Troponin I, High Sensitivity   Result Value Ref Range    Troponin I, High Sensitivity 133 (HH) 0 - 13 ng/L   Magnesium   Result Value Ref Range    Magnesium 2.45 (H) 1.60 - 2.40 mg/dL   Phosphorus   Result Value Ref Range    Phosphorus 3.0 2.5 - 4.9 mg/dL   Basic metabolic panel   Result Value Ref Range    Glucose 789 (HH) 74 - 99 mg/dL    Sodium 136 136 - 145 mmol/L    Potassium  4.2 3.5 - 5.3 mmol/L    Chloride 96 (L) 98 - 107 mmol/L    Bicarbonate 17 (L) 21 - 32 mmol/L    Anion Gap 27 (H) 10 - 20 mmol/L    Urea Nitrogen 56 (H) 6 - 23 mg/dL    Creatinine 3.29 (H) 0.50 - 1.05 mg/dL    eGFR 13 (L) >60 mL/min/1.73m*2    Calcium 8.7 8.6 - 10.3 mg/dL      CT abdomen pelvis wo IV contrast    Result Date: 2/15/2025  Interpreted By:  Dannie Soto, STUDY: CT ABDOMEN PELVIS WO IV CONTRAST;  2/15/2025 8:58 pm   INDICATION: Signs/Symptoms:abdomional pain, diffuse, lipase is 4K.     COMPARISON: None   ACCESSION NUMBER(S): [id number]   ORDERING CLINICIAN: DAMON JOSE   TECHNIQUE: CT of the abdomen and pelvis was performed. Contiguous axial images were obtained at 3 mm slice thickness through the abdomen and pelvis. Coronal and sagittal reconstructions at 3 mm slice thickness were performed.  No intravenous contrast was administered.   FINDINGS: Please note that the evaluation of vessels, lymph nodes and organs is limited without intravenous contrast.   LOWER CHEST: The included lung bases demonstrate hyperinflation. Aortic calcification. Small hiatal hernia. Coronary artery calcification.   ABDOMEN:   LIVER: The liver is diminished in attenuation suggesting fatty infiltration. No definite focal liver lesions within the limits of this unenhanced study.   BILE DUCTS: No bile duct dilatation.   GALLBLADDER: No definite gallstones. No gallbladder wall thickening.   PANCREAS: There is enlargement of the pancreatic head with surrounding fat stranding and inflammatory changes. There is fat stranding surrounding the entire pancreas but especially the pancreatic head and tail. Findings suggest likely acute pancreatitis.   SPLEEN: Unremarkable spleen.   ADRENAL GLANDS: No adrenal masses.   KIDNEYS AND URETERS: Unremarkable right kidney. Atrophic left kidney. No hydronephrosis. No hydroureter.   PELVIS:   BLADDER: Suboptimally distended and evaluated.   REPRODUCTIVE ORGANS: No definite masses.   BOWEL:  No evidence of bowel obstruction. Diverticulosis without definite diverticulitis. Unremarkable appendix.   VESSELS: Aortic calcification. No aneurysmal dilatation. Calcification at the origin of both renal arteries.   PERITONEUM/RETROPERITONEUM/LYMPH NODES: No retroperitoneal adenopathy. No ascites.   ABDOMINAL WALL: Tiny fat containing umbilical hernia.   BONES: Discogenic degenerative changes. Vacuum disc at multiple levels.       Limited by lack of IV contrast.   Disproportionate prominence of the pancreatic head. Fat stranding and inflammatory changes small amount of fluid surrounding the pancreas. Findings suggest acute pancreatitis. Correlate with clinical and laboratory findings.   Fatty infiltrated liver.   Asymmetrically atrophic left kidney.   MACRO: None   Signed by: Dannie Soto 2/15/2025 10:08 PM Dictation workstation:   [id number]    CT head wo IV contrast    Result Date: 2/15/2025  Interpreted By:  Dominik Covington, STUDY: CT HEAD WO IV CONTRAST;  2/15/2025 8:58 pm   INDICATION: Signs/Symptoms:AMS.   COMPARISON: None.   ACCESSION NUMBER(S): [id number]   ORDERING CLINICIAN: DAMON JOSE   TECHNIQUE: Noncontrast axial CT images of head were obtained with coronal and sagittal reconstructed images.   FINDINGS: BRAIN PARENCHYMA: Mild-to-moderate cortical atrophy. No acute intraparenchymal hemorrhage or parenchymal evidence of acute large territory ischemic infarct. No mass-effect. Gray-white matter distinction is preserved.   VENTRICLES and EXTRA-AXIAL SPACES:  No acute extra-axial or intraventricular hemorrhage. No effacement of cerebral sulci. Ventricles and sulci are age-concordant.   PARANASAL SINUSES/MASTOIDS:  No hemorrhage or air-fluid levels within the visualized paranasal sinuses. Postsurgical change with resection of left mastoid air cells and the left middle ear structures.   CALVARIUM/ORBITS:  No skull fracture. The orbits and globes are intact to the extent visualized.   EXTRACRANIAL SOFT  TISSUES: No discernible abnormality.       No acute intracranial abnormality.     MACRO: None.   Signed by: Dominik Covington 2/15/2025 10:00 PM Dictation workstation:   [id number]    XR chest 2 views    Result Date: 2/15/2025  Interpreted By:  Juan Lloyd, STUDY: XR CHEST 2 VIEWS;  2/15/2025 8:35 pm   INDICATION: Signs/Symptoms:cough.     COMPARISON: None.   ACCESSION NUMBER(S): [id number]   ORDERING CLINICIAN: DAMON JOSE   FINDINGS:         CARDIOMEDIASTINAL SILHOUETTE: Cardiomediastinal silhouette is normal in size and configuration.   LUNGS: There is mild bibasilar atelectasis. There is no consolidation or effusion   ABDOMEN: No remarkable upper abdominal findings.   BONES: No acute osseous changes.       1. Mild bibasilar atelectasis       MACRO: None   Signed by: Juan Lloyd 2/15/2025 8:44 PM Dictation workstation:   [id number]       Assessment/Plan     IMPRESSION:  HHS/DKA  ACUTE PANCREATITIS   COVID-19 positive     RECOMMENDATIONS:  For an insulin infusion at 7 units per hour and titrate as per protocol  Accuchecks every 1 hour  BMP every 4 hours  NPO  Hypoglycemic protocol   To continue IVF boluses givne large intravascular volume depletion and hypotension  IV fluid management as follows:  1/2 NS for blood glucose >250mg/dL  D5W with 1/2 NS for blood glucose <250mg/dL  D10W for blood glucose <150mg/dL if anion gap is open or <70 with closed AG.   Please hold insulin for serum potassium <3.3  Will plan to bridge with Lantus 15 units when the anion Gap is less than 15 and the Bicarbonate is more than 15   To obtain an A1C value   To obtain   Will continue to follow    Thank you for the courtesy of this consult     Андрей Hsu MD

## 2025-02-16 NOTE — PROCEDURES
Intubation    Date/Time: 2/16/2025 12:05 PM    Performed by: Godwin Alcantara MD  Authorized by: Godwin Alcantara MD    Consent:     Consent obtained:  Verbal    Consent given by: patient's daughter.    Risks, benefits, and alternatives were discussed: yes    Universal protocol:     Patient identity confirmed:  Arm band  Pre-procedure details:     Indications: airway protection, altered consciousness and respiratory distress      Patient status:  Unresponsive    Pharmacologic strategy: RSI      Induction agents:  Etomidate    Paralytics:  Rocuronium  Procedure details:     Preoxygenation:  Bag valve mask    CPR in progress: no      Number of attempts:  1  Successful intubation attempt details:     Intubation method:  Oral    Intubation technique: video assisted      Laryngoscope blade:  Hypercurved    Bougie used: no      Grade view: III      Tube size (mm):  7.5    Tube type:  Cuffed    Tube visualized through cords: yes    Placement assessment:     ETT at teeth/gumline (cm):  22    Tube secured with:  ETT liang    Breath sounds:  Equal    Placement verification: colorimetric ETCO2    Post-procedure details:     Procedure completion:  Tolerated well, no immediate complications  Comments:      Patient had large amount of coffee ground emesis throughout oral cavity which was suctioned prior to advancing ET tube through vocal cord.

## 2025-02-17 VITALS
HEIGHT: 66 IN | WEIGHT: 153.66 LBS | OXYGEN SATURATION: 97 % | SYSTOLIC BLOOD PRESSURE: 91 MMHG | BODY MASS INDEX: 24.7 KG/M2 | TEMPERATURE: 95.4 F | HEART RATE: 101 BPM | RESPIRATION RATE: 35 BRPM | DIASTOLIC BLOOD PRESSURE: 30 MMHG

## 2025-02-17 VITALS
SYSTOLIC BLOOD PRESSURE: 82 MMHG | DIASTOLIC BLOOD PRESSURE: 44 MMHG | RESPIRATION RATE: 16 BRPM | TEMPERATURE: 95.4 F | HEIGHT: 66 IN | BODY MASS INDEX: 24.7 KG/M2 | OXYGEN SATURATION: 93 % | WEIGHT: 153.66 LBS

## 2025-02-17 LAB
ANION GAP BLDA CALCULATED.4IONS-SCNC: 23 MMO/L (ref 10–25)
ANION GAP SERPL CALC-SCNC: 23 MMOL/L (ref 10–20)
BACTERIA UR CULT: NORMAL
BASE EXCESS BLDA CALC-SCNC: -20 MMOL/L (ref -2–3)
BLOOD EXPIRATION DATE: NORMAL
BODY TEMPERATURE: 37 DEGREES CELSIUS
BUN SERPL-MCNC: 52 MG/DL (ref 6–23)
CA-I BLDA-SCNC: 1.04 MMOL/L (ref 1.1–1.33)
CALCIUM SERPL-MCNC: 6.8 MG/DL (ref 8.6–10.3)
CANCER AG19-9 SERPL-ACNC: 85.08 U/ML
CHLORIDE BLDA-SCNC: 104 MMOL/L (ref 98–107)
CHLORIDE SERPL-SCNC: 107 MMOL/L (ref 98–107)
CO2 SERPL-SCNC: 12 MMOL/L (ref 21–32)
CREAT SERPL-MCNC: 3.5 MG/DL (ref 0.5–1.05)
DISPENSE STATUS: NORMAL
EGFRCR SERPLBLD CKD-EPI 2021: 12 ML/MIN/1.73M*2
EST. AVERAGE GLUCOSE BLD GHB EST-MCNC: 404 MG/DL
GLUCOSE BLD MANUAL STRIP-MCNC: 223 MG/DL (ref 74–99)
GLUCOSE BLDA-MCNC: 216 MG/DL (ref 74–99)
GLUCOSE SERPL-MCNC: 220 MG/DL (ref 74–99)
HBA1C MFR BLD: 15.7 %
HCO3 BLDA-SCNC: 9.7 MMOL/L (ref 22–26)
HCT VFR BLD AUTO: 36.9 % (ref 36–46)
HCT VFR BLD EST: 36 % (ref 36–46)
HGB BLD-MCNC: 11.5 G/DL (ref 12–16)
HGB BLDA-MCNC: 12 G/DL (ref 12–16)
INHALED O2 CONCENTRATION: 100 %
KAPPA LC SERPL-MCNC: 13.19 MG/DL (ref 0.33–1.94)
KAPPA LC/LAMBDA SER: 1.75 {RATIO} (ref 0.26–1.65)
LACTATE BLDA-SCNC: 12.7 MMOL/L (ref 0.4–2)
LAMBDA LC SERPL-MCNC: 7.54 MG/DL (ref 0.57–2.63)
OSMOLALITY SERPL: 348 MOSM/KG (ref 280–300)
OXYHGB MFR BLDA: 92.7 % (ref 94–98)
PCO2 BLDA: 36 MM HG (ref 38–42)
PH BLDA: 7.04 PH (ref 7.38–7.42)
PO2 BLDA: 73 MM HG (ref 85–95)
POTASSIUM BLDA-SCNC: 4.9 MMOL/L (ref 3.5–5.3)
POTASSIUM SERPL-SCNC: 4.8 MMOL/L (ref 3.5–5.3)
PRODUCT BLOOD TYPE: 5100
PRODUCT CODE: NORMAL
PROT SERPL-MCNC: 6 G/DL (ref 6.4–8.2)
SAO2 % BLDA: 94 % (ref 94–100)
SODIUM BLDA-SCNC: 132 MMOL/L (ref 136–145)
SODIUM SERPL-SCNC: 137 MMOL/L (ref 136–145)
UNIT ABO: NORMAL
UNIT NUMBER: NORMAL
UNIT RH: NORMAL
UNIT VOLUME: 292
XM INTEP: NORMAL

## 2025-02-17 PROCEDURE — 2500000002 HC RX 250 W HCPCS SELF ADMINISTERED DRUGS (ALT 637 FOR MEDICARE OP, ALT 636 FOR OP/ED)

## 2025-02-17 PROCEDURE — 2500000004 HC RX 250 GENERAL PHARMACY W/ HCPCS (ALT 636 FOR OP/ED)

## 2025-02-17 PROCEDURE — 85014 HEMATOCRIT: CPT

## 2025-02-17 PROCEDURE — 94003 VENT MGMT INPAT SUBQ DAY: CPT

## 2025-02-17 PROCEDURE — 37799 UNLISTED PX VASCULAR SURGERY: CPT

## 2025-02-17 PROCEDURE — 84132 ASSAY OF SERUM POTASSIUM: CPT

## 2025-02-17 PROCEDURE — 82947 ASSAY GLUCOSE BLOOD QUANT: CPT

## 2025-02-17 RX ORDER — LORAZEPAM 2 MG/ML
0.5 INJECTION INTRAMUSCULAR EVERY 4 HOURS PRN
Status: DISCONTINUED | OUTPATIENT
Start: 2025-02-17 | End: 2025-02-17 | Stop reason: HOSPADM

## 2025-02-17 RX ORDER — HYDROMORPHONE HYDROCHLORIDE 0.2 MG/ML
0.2 INJECTION INTRAMUSCULAR; INTRAVENOUS; SUBCUTANEOUS
Status: DISCONTINUED | OUTPATIENT
Start: 2025-02-17 | End: 2025-02-17 | Stop reason: HOSPADM

## 2025-02-17 RX ORDER — GLYCOPYRROLATE 0.2 MG/ML
0.2 INJECTION INTRAMUSCULAR; INTRAVENOUS EVERY 4 HOURS PRN
Status: DISCONTINUED | OUTPATIENT
Start: 2025-02-17 | End: 2025-02-17 | Stop reason: HOSPADM

## 2025-02-17 RX ADMIN — INSULIN LISPRO 4 UNITS: 100 INJECTION, SOLUTION INTRAVENOUS; SUBCUTANEOUS at 00:10

## 2025-02-17 RX ADMIN — HYDROMORPHONE HYDROCHLORIDE 0.4 MG: 0.5 INJECTION, SOLUTION INTRAMUSCULAR; INTRAVENOUS; SUBCUTANEOUS at 01:47

## 2025-02-17 RX ADMIN — GLYCOPYRROLATE 0.2 MG: 0.2 INJECTION INTRAMUSCULAR; INTRAVENOUS at 01:47

## 2025-02-17 RX ADMIN — SODIUM CHLORIDE, POTASSIUM CHLORIDE, SODIUM LACTATE AND CALCIUM CHLORIDE 1000 ML: 600; 310; 30; 20 INJECTION, SOLUTION INTRAVENOUS at 01:20

## 2025-02-17 RX ADMIN — LORAZEPAM 0.5 MG: 2 INJECTION INTRAMUSCULAR; INTRAVENOUS at 01:47

## 2025-02-17 ASSESSMENT — PAIN - FUNCTIONAL ASSESSMENT: PAIN_FUNCTIONAL_ASSESSMENT: CPOT (CRITICAL CARE PAIN OBSERVATION TOOL)

## 2025-02-17 NOTE — SIGNIFICANT EVENT
Was called by nursing to patient's bedside. There were no heart tones present. There were no breath sounds or spontaneous respirations. There was no palpable carotid pulse. The pupils were fixed and dilated. There was no response to noxious stimli. Family is updated. Patient is pronounced on 02/17/25 at 0238.

## 2025-02-17 NOTE — PROGRESS NOTES
Critical Care Daily Progress Notes        Subjective   Patient is a 84 y.o. female admitted on 2/15/2025  7:04 PM with the following indication(s) for ICU care: COVID, HSS, pancreatitis    Interval History:     -This morning large-volume coffee-ground emesis  -Unable to protect airway with impending respiratory failure  -Required emergent intubation    Scheduled Medications:   hydrocortisone sodium succinate, 100 mg, intravenous, q8h  insulin lispro, 0-10 Units, subcutaneous, q4h  lactated Ringer's, 1,000 mL, intravenous, Once  meropenem, 1,000 mg, intravenous, q24h  oxygen, , inhalation, Continuous - Inhalation  pantoprazole, 40 mg, intravenous, q12h  perflutren lipid microspheres, 0.5-10 mL of dilution, intravenous, Once in imaging  perflutren protein A microsphere, 0.5 mL, intravenous, Once in imaging  [Held by provider] pravastatin, 20 mg, oral, Daily  sulfur hexafluoride microsphr, 2 mL, intravenous, Once in imaging         Continuous Medications:   fentaNYL, 0-200 mcg/hr  norepinephrine, 0-1 mcg/kg/min, Last Rate: 0.6 mcg/kg/min (02/16/25 2000)  propofol, 0-20 mcg/kg/min, Last Rate: Stopped (02/16/25 1300)  sodium bicarbonate 1 mEq/mL (8.4 %) 75 mEq in sodium chloride 0.45 % 1,075 mL infusion, 125 mL/hr, Last Rate: 125 mL/hr (02/16/25 2110)  vasopressin, 0.03 Units/min, Last Rate: 0.03 Units/min (02/16/25 2100)         PRN Medications:   PRN medications: bisacodyl, dextrose, dextrose, glucagon, glucagon, guaiFENesin, insulin regular, melatonin, ondansetron **OR** ondansetron, vancomycin    Objective   Vitals:  Most Recent:  Vitals:    02/16/25 2230   BP: (!) 125/93   Pulse: 109   Resp: (!) 56   Temp:    SpO2: 96%       24hr Min/Max:  Temp  Min: 36 °C (96.8 °F)  Max: 36.8 °C (98.2 °F)  Pulse  Min: 87  Max: 144  BP  Min: 91/60  Max: 148/72  Resp  Min: 13  Max: 57  SpO2  Min: 82 %  Max: 100 %    LDA:  CVC 02/16/25 Triple lumen Non-tunneled Right Internal jugular (Active)   Placement Date/Time: 02/16/25 105    Site Prep: Chlorhexidine   Site Prep Agent has Completely Dried Before Insertion: Yes  All 5 Sterile Barriers Used (Gloves, Gown, Cap, Mask, Large Sterile Drape): Yes  Lumen Type: Triple lumen  CVC Type: Non-tunnel...   Number of days: 0       Urethral Catheter 16 Fr. (Active)   Placement Date/Time: 02/16/25 0330   Placed by: GIO Sawant RN  Hand Hygiene Completed: Yes  Tube Size (Fr.): 16 Fr.  Catheter Balloon Size: 10 mL  Urine Returned: Yes   Number of days: 0         Vent settings:  Vent Mode: Assist control/Volume control plus  S RR:  [] 242  S VT:  [450 mL] 450 mL  PEEP/CPAP (cm H2O):  [8 cm H20-10 cm H20] 8 cm H20  MAP (cm H2O):  [14-22] 22    Hemodynamic parameters for last 24 hours:       I/O:    Intake/Output Summary (Last 24 hours) at 2/16/2025 2237  Last data filed at 2/16/2025 1800  Gross per 24 hour   Intake 3493.47 ml   Output 0 ml   Net 3493.47 ml       Physical Exam:   Physical Exam  Vitals and nursing note reviewed.   Constitutional:       Comments: Elderly, cephalopathic, unable to follow commands   HENT:      Head: Normocephalic and atraumatic.      Mouth/Throat:      Comments: Significant amount of coffee-ground emesis  Eyes:      Pupils: Pupils are equal, round, and reactive to light.   Cardiovascular:      Rate and Rhythm: Regular rhythm. Tachycardia present.   Pulmonary:      Comments: Coarse breath sound anteriorly  Abdominal:      Palpations: Abdomen is soft.      Comments: No rebound   Musculoskeletal:         General: No swelling.   Skin:     General: Skin is dry.      Capillary Refill: Capillary refill takes more than 3 seconds.      Comments: cool   Neurological:      GCS: GCS eye subscore is 4. GCS verbal subscore is 1. GCS motor subscore is 4.      Comments: Anisocoria. Left pupil 3mm with brisk reaction to light accomodation. Right pupil 5mm and nonreactive. Right eye ptosis and patient unable to spontaneously open eye. Left eye open. No nystagmus. Absent corneal reflex right eye.  Positive corneal reflex left eye. Positive cough and gag reflex. Moves all extremities spontaneously. Withdraws to pain in all extremities.           Lab/Radiology/Diagnostic Review:  Results for orders placed or performed during the hospital encounter of 02/15/25 (from the past 24 hours)   Basic metabolic panel   Result Value Ref Range    Glucose 1,035 (HH) 74 - 99 mg/dL    Sodium 137 136 - 145 mmol/L    Potassium 3.0 (L) 3.5 - 5.3 mmol/L    Chloride 92 (L) 98 - 107 mmol/L    Bicarbonate 21 21 - 32 mmol/L    Anion Gap 27 (H) 10 - 20 mmol/L    Urea Nitrogen 54 (H) 6 - 23 mg/dL    Creatinine 2.98 (H) 0.50 - 1.05 mg/dL    eGFR 15 (L) >60 mL/min/1.73m*2    Calcium 9.2 8.6 - 10.3 mg/dL   Magnesium   Result Value Ref Range    Magnesium 2.74 (H) 1.60 - 2.40 mg/dL   Phosphorus   Result Value Ref Range    Phosphorus 4.0 2.5 - 4.9 mg/dL   Lactate   Result Value Ref Range    Lactate 6.6 (HH) 0.4 - 2.0 mmol/L   Troponin I, High Sensitivity   Result Value Ref Range    Troponin I, High Sensitivity 89 (HH) 0 - 13 ng/L   POCT GLUCOSE   Result Value Ref Range    POCT Glucose >600 (H) 74 - 99 mg/dL   Lactate   Result Value Ref Range    Lactate 5.5 (HH) 0.4 - 2.0 mmol/L   Basic Metabolic Panel   Result Value Ref Range    Glucose 998 (HH) 74 - 99 mg/dL    Sodium 132 (L) 136 - 145 mmol/L    Potassium 4.6 3.5 - 5.3 mmol/L    Chloride 94 (L) 98 - 107 mmol/L    Bicarbonate 16 (L) 21 - 32 mmol/L    Anion Gap 27 (H) 10 - 20 mmol/L    Urea Nitrogen 55 (H) 6 - 23 mg/dL    Creatinine 3.03 (H) 0.50 - 1.05 mg/dL    eGFR 15 (L) >60 mL/min/1.73m*2    Calcium 8.8 8.6 - 10.3 mg/dL   POCT GLUCOSE   Result Value Ref Range    POCT Glucose >600 (H) 74 - 99 mg/dL   Urinalysis with Reflex Culture and Microscopic   Result Value Ref Range    Color, Urine Light-Yellow Light-Yellow, Yellow, Dark-Yellow    Appearance, Urine Turbid (N) Clear    Specific Gravity, Urine 1.025 1.005 - 1.035    pH, Urine 5.0 5.0, 5.5, 6.0, 6.5, 7.0, 7.5, 8.0    Protein, Urine 20  (TRACE) NEGATIVE, 10 (TRACE), 20 (TRACE) mg/dL    Glucose, Urine OVER (4+) (A) Normal mg/dL    Blood, Urine NEGATIVE NEGATIVE mg/dL    Ketones, Urine NEGATIVE NEGATIVE mg/dL    Bilirubin, Urine NEGATIVE NEGATIVE mg/dL    Urobilinogen, Urine Normal Normal mg/dL    Nitrite, Urine NEGATIVE NEGATIVE    Leukocyte Esterase, Urine 250 Lynda/uL (A) NEGATIVE   Extra Urine Gray Tube   Result Value Ref Range    Extra Tube Hold for add-ons.    Microscopic Only, Urine   Result Value Ref Range    WBC, Urine 6-10 (A) 1-5, NONE /HPF    WBC Clumps, Urine OCCASIONAL Reference range not established. /HPF    RBC, Urine 6-10 (A) NONE, 1-2, 3-5 /HPF    Squamous Epithelial Cells, Urine 1-9 (SPARSE) Reference range not established. /HPF    Bacteria, Urine 1+ (A) NONE SEEN /HPF    Mucus, Urine FEW Reference range not established. /LPF    Hyaline Casts, Urine OCCASIONAL (A) NONE /LPF    Amorphous Crystals, Urine 1+ NONE, 1+, 2+ /HPF   Lactate   Result Value Ref Range    Lactate 5.0 (HH) 0.4 - 2.0 mmol/L   Troponin I, High Sensitivity   Result Value Ref Range    Troponin I, High Sensitivity 102 (HH) 0 - 13 ng/L   Glucose, random   Result Value Ref Range    Glucose 990 (HH) 74 - 99 mg/dL   POCT GLUCOSE   Result Value Ref Range    POCT Glucose >600 (H) 74 - 99 mg/dL   CBC and Auto Differential   Result Value Ref Range    WBC 12.9 (H) 4.4 - 11.3 x10*3/uL    nRBC 0.0 0.0 - 0.0 /100 WBCs    RBC 5.88 (H) 4.00 - 5.20 x10*6/uL    Hemoglobin 15.7 12.0 - 16.0 g/dL    Hematocrit 49.3 (H) 36.0 - 46.0 %    MCV 84 80 - 100 fL    MCH 26.7 26.0 - 34.0 pg    MCHC 31.8 (L) 32.0 - 36.0 g/dL    RDW 14.6 (H) 11.5 - 14.5 %    Platelets 131 (L) 150 - 450 x10*3/uL    Neutrophils % 90.8 40.0 - 80.0 %    Immature Granulocytes %, Automated 0.5 0.0 - 0.9 %    Lymphocytes % 5.2 13.0 - 44.0 %    Monocytes % 1.8 2.0 - 10.0 %    Eosinophils % 1.4 0.0 - 6.0 %    Basophils % 0.3 0.0 - 2.0 %    Neutrophils Absolute 11.66 (H) 1.60 - 5.50 x10*3/uL    Immature Granulocytes  Absolute, Automated 0.07 0.00 - 0.50 x10*3/uL    Lymphocytes Absolute 0.67 (L) 0.80 - 3.00 x10*3/uL    Monocytes Absolute 0.23 0.05 - 0.80 x10*3/uL    Eosinophils Absolute 0.18 0.00 - 0.40 x10*3/uL    Basophils Absolute 0.04 0.00 - 0.10 x10*3/uL   Blood Culture    Specimen: Peripheral Venipuncture; Blood culture   Result Value Ref Range    Blood Culture Loaded on Instrument - Culture in progress    Blood Culture    Specimen: Peripheral Venipuncture; Blood culture   Result Value Ref Range    Blood Culture Loaded on Instrument - Culture in progress    Hepatic Function Panel   Result Value Ref Range    Albumin 3.0 (L) 3.4 - 5.0 g/dL    Bilirubin, Total 0.6 0.0 - 1.2 mg/dL    Bilirubin, Direct 0.2 0.0 - 0.3 mg/dL    Alkaline Phosphatase 113 33 - 136 U/L    ALT 43 7 - 45 U/L    AST 54 (H) 9 - 39 U/L    Total Protein 6.4 6.4 - 8.2 g/dL   Basic metabolic panel   Result Value Ref Range    Glucose 899 (HH) 74 - 99 mg/dL    Sodium 137 136 - 145 mmol/L    Potassium 3.9 3.5 - 5.3 mmol/L    Chloride 94 (L) 98 - 107 mmol/L    Bicarbonate 20 (L) 21 - 32 mmol/L    Anion Gap 27 (H) 10 - 20 mmol/L    Urea Nitrogen 56 (H) 6 - 23 mg/dL    Creatinine 3.23 (H) 0.50 - 1.05 mg/dL    eGFR 13 (L) >60 mL/min/1.73m*2    Calcium 8.9 8.6 - 10.3 mg/dL   Magnesium   Result Value Ref Range    Magnesium 2.59 (H) 1.60 - 2.40 mg/dL   Phosphorus   Result Value Ref Range    Phosphorus 3.2 2.5 - 4.9 mg/dL   POCT GLUCOSE   Result Value Ref Range    POCT Glucose >600 (H) 74 - 99 mg/dL   Lactate   Result Value Ref Range    Lactate 7.5 (HH) 0.4 - 2.0 mmol/L   Troponin I, High Sensitivity   Result Value Ref Range    Troponin I, High Sensitivity 133 (HH) 0 - 13 ng/L   Magnesium   Result Value Ref Range    Magnesium 2.45 (H) 1.60 - 2.40 mg/dL   Phosphorus   Result Value Ref Range    Phosphorus 3.0 2.5 - 4.9 mg/dL   Basic metabolic panel   Result Value Ref Range    Glucose 789 (HH) 74 - 99 mg/dL    Sodium 136 136 - 145 mmol/L    Potassium 4.2 3.5 - 5.3 mmol/L     Chloride 96 (L) 98 - 107 mmol/L    Bicarbonate 17 (L) 21 - 32 mmol/L    Anion Gap 27 (H) 10 - 20 mmol/L    Urea Nitrogen 56 (H) 6 - 23 mg/dL    Creatinine 3.29 (H) 0.50 - 1.05 mg/dL    eGFR 13 (L) >60 mL/min/1.73m*2    Calcium 8.7 8.6 - 10.3 mg/dL   Basic metabolic panel   Result Value Ref Range    Glucose 663 (HH) 74 - 99 mg/dL    Sodium 138 136 - 145 mmol/L    Potassium 3.9 3.5 - 5.3 mmol/L    Chloride 96 (L) 98 - 107 mmol/L    Bicarbonate 22 21 - 32 mmol/L    Anion Gap 24 (H) 10 - 20 mmol/L    Urea Nitrogen 56 (H) 6 - 23 mg/dL    Creatinine 3.36 (H) 0.50 - 1.05 mg/dL    eGFR 13 (L) >60 mL/min/1.73m*2    Calcium 8.6 8.6 - 10.3 mg/dL   Lactate   Result Value Ref Range    Lactate 8.1 (HH) 0.4 - 2.0 mmol/L   Protime-INR   Result Value Ref Range    Protime 13.0 (H) 9.8 - 12.8 seconds    INR 1.2 (H) 0.9 - 1.1   Fibrinogen   Result Value Ref Range    Fibrinogen 170 (L) 200 - 400 mg/dL   Type and screen   Result Value Ref Range    ABO TYPE O     Rh TYPE POS     ANTIBODY SCREEN NEG    Prepare RBC: 1 Units   Result Value Ref Range    PRODUCT CODE [id number]     Unit Number [id number]     Unit ABO O     Unit RH POS     XM INTEP COMP     Dispense Status XM     Blood Expiration Date 3/15/2025 11:59:00 PM EDT     PRODUCT BLOOD TYPE 5100     UNIT VOLUME 292    Hemoglobin and hematocrit, blood   Result Value Ref Range    Hemoglobin 12.9 12.0 - 16.0 g/dL    Hematocrit 40.8 36.0 - 46.0 %   ABO/Rh   Result Value Ref Range    ABO TYPE O     Rh TYPE POS    POCT GLUCOSE   Result Value Ref Range    POCT Glucose >600 (H) 74 - 99 mg/dL   Lactate   Result Value Ref Range    Lactate 7.5 (HH) 0.4 - 2.0 mmol/L   Magnesium   Result Value Ref Range    Magnesium 2.40 1.60 - 2.40 mg/dL   Phosphorus   Result Value Ref Range    Phosphorus 4.4 2.5 - 4.9 mg/dL   Troponin I, High Sensitivity   Result Value Ref Range    Troponin I, High Sensitivity 187 (HH) 0 - 13 ng/L   Basic metabolic panel   Result Value Ref Range    Glucose 575 () 74 - 99  mg/dL    Sodium 139 136 - 145 mmol/L    Potassium 4.3 3.5 - 5.3 mmol/L    Chloride 99 98 - 107 mmol/L    Bicarbonate 22 21 - 32 mmol/L    Anion Gap 22 (H) 10 - 20 mmol/L    Urea Nitrogen 56 (H) 6 - 23 mg/dL    Creatinine 3.44 (H) 0.50 - 1.05 mg/dL    eGFR 12 (L) >60 mL/min/1.73m*2    Calcium 8.5 (L) 8.6 - 10.3 mg/dL   Creatine Kinase   Result Value Ref Range    Creatine Kinase 91 0 - 215 U/L   POCT GLUCOSE   Result Value Ref Range    POCT Glucose 514 (H) 74 - 99 mg/dL   BLOOD GAS ARTERIAL FULL PANEL   Result Value Ref Range    POCT pH, Arterial 7.13 (LL) 7.38 - 7.42 pH    POCT pCO2, Arterial 56 (H) 38 - 42 mm Hg    POCT pO2, Arterial 88 85 - 95 mm Hg    POCT SO2, Arterial 96 94 - 100 %    POCT Oxy Hemoglobin, Arterial 94.0 94.0 - 98.0 %    POCT Hematocrit Calculated, Arterial 40.0 36.0 - 46.0 %    POCT Sodium, Arterial 136 136 - 145 mmol/L    POCT Potassium, Arterial 4.0 3.5 - 5.3 mmol/L    POCT Chloride, Arterial 104 98 - 107 mmol/L    POCT Ionized Calcium, Arterial 1.16 1.10 - 1.33 mmol/L    POCT Glucose, Arterial 454 (HH) 74 - 99 mg/dL    POCT Lactate, Arterial 6.4 (HH) 0.4 - 2.0 mmol/L    POCT Base Excess, Arterial -10.9 (L) -2.0 - 3.0 mmol/L    POCT HCO3 Calculated, Arterial 18.6 (L) 22.0 - 26.0 mmol/L    POCT Hemoglobin, Arterial 13.4 12.0 - 16.0 g/dL    POCT Anion Gap, Arterial 17 10 - 25 mmo/L    Patient Temperature 37.0 degrees Celsius    FiO2 100 %   POCT GLUCOSE   Result Value Ref Range    POCT Glucose 499 (H) 74 - 99 mg/dL   POCT GLUCOSE   Result Value Ref Range    POCT Glucose 449 (H) 74 - 99 mg/dL   POCT GLUCOSE   Result Value Ref Range    POCT Glucose 294 (H) 74 - 99 mg/dL   Magnesium   Result Value Ref Range    Magnesium 2.11 1.60 - 2.40 mg/dL   Phosphorus   Result Value Ref Range    Phosphorus 4.3 2.5 - 4.9 mg/dL   Troponin I, High Sensitivity   Result Value Ref Range    Troponin I, High Sensitivity 299 (HH) 0 - 13 ng/L   Basic metabolic panel   Result Value Ref Range    Glucose 323 (H) 74 - 99  mg/dL    Sodium 140 136 - 145 mmol/L    Potassium 3.9 3.5 - 5.3 mmol/L    Chloride 106 98 - 107 mmol/L    Bicarbonate 18 (L) 21 - 32 mmol/L    Anion Gap 20 10 - 20 mmol/L    Urea Nitrogen 55 (H) 6 - 23 mg/dL    Creatinine 3.53 (H) 0.50 - 1.05 mg/dL    eGFR 12 (L) >60 mL/min/1.73m*2    Calcium 7.9 (L) 8.6 - 10.3 mg/dL   Lipase   Result Value Ref Range    Lipase 2,885 (H) 9 - 82 U/L   Blood Gas Arterial Full Panel   Result Value Ref Range    POCT pH, Arterial 7.16 (LL) 7.38 - 7.42 pH    POCT pCO2, Arterial 45 (H) 38 - 42 mm Hg    POCT pO2, Arterial 56 (L) 85 - 95 mm Hg    POCT SO2, Arterial 84 (L) 94 - 100 %    POCT Oxy Hemoglobin, Arterial 82.4 (L) 94.0 - 98.0 %    POCT Hematocrit Calculated, Arterial 42.0 36.0 - 46.0 %    POCT Sodium, Arterial 137 136 - 145 mmol/L    POCT Potassium, Arterial 4.0 3.5 - 5.3 mmol/L    POCT Chloride, Arterial 102 98 - 107 mmol/L    POCT Ionized Calcium, Arterial 1.12 1.10 - 1.33 mmol/L    POCT Glucose, Arterial 321 (H) 74 - 99 mg/dL    POCT Lactate, Arterial 7.9 (HH) 0.4 - 2.0 mmol/L    POCT Base Excess, Arterial -12.4 (L) -2.0 - 3.0 mmol/L    POCT HCO3 Calculated, Arterial 16.0 (L) 22.0 - 26.0 mmol/L    POCT Hemoglobin, Arterial 14.0 12.0 - 16.0 g/dL    POCT Anion Gap, Arterial 23 10 - 25 mmo/L    Patient Temperature 37.0 degrees Celsius    FiO2 100 %   POCT GLUCOSE   Result Value Ref Range    POCT Glucose 300 (H) 74 - 99 mg/dL   POCT GLUCOSE   Result Value Ref Range    POCT Glucose 297 (H) 74 - 99 mg/dL   POCT GLUCOSE   Result Value Ref Range    POCT Glucose 248 (H) 74 - 99 mg/dL   Magnesium   Result Value Ref Range    Magnesium 1.96 1.60 - 2.40 mg/dL   Phosphorus   Result Value Ref Range    Phosphorus 3.7 2.5 - 4.9 mg/dL   Basic metabolic panel   Result Value Ref Range    Glucose 200 (H) 74 - 99 mg/dL    Sodium 139 136 - 145 mmol/L    Potassium 4.1 3.5 - 5.3 mmol/L    Chloride 109 (H) 98 - 107 mmol/L    Bicarbonate 14 (L) 21 - 32 mmol/L    Anion Gap 20 10 - 20 mmol/L    Urea Nitrogen  51 (H) 6 - 23 mg/dL    Creatinine 3.50 (H) 0.50 - 1.05 mg/dL    eGFR 12 (L) >60 mL/min/1.73m*2    Calcium 7.3 (L) 8.6 - 10.3 mg/dL   Blood Gas Arterial Full Panel   Result Value Ref Range    POCT pH, Arterial 7.08 (LL) 7.38 - 7.42 pH    POCT pCO2, Arterial 40 38 - 42 mm Hg    POCT pO2, Arterial 72 (L) 85 - 95 mm Hg    POCT SO2, Arterial 93 (L) 94 - 100 %    POCT Oxy Hemoglobin, Arterial 91.2 (L) 94.0 - 98.0 %    POCT Hematocrit Calculated, Arterial 40.0 36.0 - 46.0 %    POCT Sodium, Arterial 134 (L) 136 - 145 mmol/L    POCT Potassium, Arterial 4.1 3.5 - 5.3 mmol/L    POCT Chloride, Arterial 106 98 - 107 mmol/L    POCT Ionized Calcium, Arterial 1.10 1.10 - 1.33 mmol/L    POCT Glucose, Arterial 212 (H) 74 - 99 mg/dL    POCT Lactate, Arterial 10.4 (HH) 0.4 - 2.0 mmol/L    POCT Base Excess, Arterial -17.5 (L) -2.0 - 3.0 mmol/L    POCT HCO3 Calculated, Arterial 11.9 (L) 22.0 - 26.0 mmol/L    POCT Hemoglobin, Arterial 13.3 12.0 - 16.0 g/dL    POCT Anion Gap, Arterial 20 10 - 25 mmo/L    Patient Temperature      FiO2 100 %   Comprehensive metabolic panel   Result Value Ref Range    Glucose 200 (H) 74 - 99 mg/dL    Sodium 139 136 - 145 mmol/L    Potassium 4.1 3.5 - 5.3 mmol/L    Chloride 109 (H) 98 - 107 mmol/L    Bicarbonate 14 (L) 21 - 32 mmol/L    Anion Gap 20 10 - 20 mmol/L    Urea Nitrogen 51 (H) 6 - 23 mg/dL    Creatinine 3.50 (H) 0.50 - 1.05 mg/dL    eGFR 12 (L) >60 mL/min/1.73m*2    Calcium 7.3 (L) 8.6 - 10.3 mg/dL    Albumin 3.1 (L) 3.4 - 5.0 g/dL    Alkaline Phosphatase 74 33 - 136 U/L    Total Protein 4.9 (L) 6.4 - 8.2 g/dL     (H) 9 - 39 U/L    Bilirubin, Total 0.8 0.0 - 1.2 mg/dL     (H) 7 - 45 U/L   POCT GLUCOSE   Result Value Ref Range    POCT Glucose 202 (H) 74 - 99 mg/dL   POCT GLUCOSE   Result Value Ref Range    POCT Glucose 223 (H) 74 - 99 mg/dL     CT abdomen pelvis wo IV contrast    Result Date: 2/15/2025  Interpreted By:  Dannie Soto, STUDY: CT ABDOMEN PELVIS WO IV CONTRAST;  2/15/2025  8:58 pm   INDICATION: Signs/Symptoms:abdomional pain, diffuse, lipase is 4K.     COMPARISON: None   ACCESSION NUMBER(S): [id number]   ORDERING CLINICIAN: DAMON JOSE   TECHNIQUE: CT of the abdomen and pelvis was performed. Contiguous axial images were obtained at 3 mm slice thickness through the abdomen and pelvis. Coronal and sagittal reconstructions at 3 mm slice thickness were performed.  No intravenous contrast was administered.   FINDINGS: Please note that the evaluation of vessels, lymph nodes and organs is limited without intravenous contrast.   LOWER CHEST: The included lung bases demonstrate hyperinflation. Aortic calcification. Small hiatal hernia. Coronary artery calcification.   ABDOMEN:   LIVER: The liver is diminished in attenuation suggesting fatty infiltration. No definite focal liver lesions within the limits of this unenhanced study.   BILE DUCTS: No bile duct dilatation.   GALLBLADDER: No definite gallstones. No gallbladder wall thickening.   PANCREAS: There is enlargement of the pancreatic head with surrounding fat stranding and inflammatory changes. There is fat stranding surrounding the entire pancreas but especially the pancreatic head and tail. Findings suggest likely acute pancreatitis.   SPLEEN: Unremarkable spleen.   ADRENAL GLANDS: No adrenal masses.   KIDNEYS AND URETERS: Unremarkable right kidney. Atrophic left kidney. No hydronephrosis. No hydroureter.   PELVIS:   BLADDER: Suboptimally distended and evaluated.   REPRODUCTIVE ORGANS: No definite masses.   BOWEL: No evidence of bowel obstruction. Diverticulosis without definite diverticulitis. Unremarkable appendix.   VESSELS: Aortic calcification. No aneurysmal dilatation. Calcification at the origin of both renal arteries.   PERITONEUM/RETROPERITONEUM/LYMPH NODES: No retroperitoneal adenopathy. No ascites.   ABDOMINAL WALL: Tiny fat containing umbilical hernia.   BONES: Discogenic degenerative changes. Vacuum disc at multiple  levels.       Limited by lack of IV contrast.   Disproportionate prominence of the pancreatic head. Fat stranding and inflammatory changes small amount of fluid surrounding the pancreas. Findings suggest acute pancreatitis. Correlate with clinical and laboratory findings.   Fatty infiltrated liver.   Asymmetrically atrophic left kidney.   MACRO: None   Signed by: Dannie Soto 2/15/2025 10:08 PM Dictation workstation:   [id number]    CT head wo IV contrast    Result Date: 2/15/2025  Interpreted By:  Dominik Covington, STUDY: CT HEAD WO IV CONTRAST;  2/15/2025 8:58 pm   INDICATION: Signs/Symptoms:AMS.   COMPARISON: None.   ACCESSION NUMBER(S): [id number]   ORDERING CLINICIAN: DAMON JOSE   TECHNIQUE: Noncontrast axial CT images of head were obtained with coronal and sagittal reconstructed images.   FINDINGS: BRAIN PARENCHYMA: Mild-to-moderate cortical atrophy. No acute intraparenchymal hemorrhage or parenchymal evidence of acute large territory ischemic infarct. No mass-effect. Gray-white matter distinction is preserved.   VENTRICLES and EXTRA-AXIAL SPACES:  No acute extra-axial or intraventricular hemorrhage. No effacement of cerebral sulci. Ventricles and sulci are age-concordant.   PARANASAL SINUSES/MASTOIDS:  No hemorrhage or air-fluid levels within the visualized paranasal sinuses. Postsurgical change with resection of left mastoid air cells and the left middle ear structures.   CALVARIUM/ORBITS:  No skull fracture. The orbits and globes are intact to the extent visualized.   EXTRACRANIAL SOFT TISSUES: No discernible abnormality.       No acute intracranial abnormality.     MACRO: None.   Signed by: Dominik Covington 2/15/2025 10:00 PM Dictation workstation:   [id number]    XR chest 2 views    Result Date: 2/15/2025  Interpreted By:  Juan Lloyd, STUDY: XR CHEST 2 VIEWS;  2/15/2025 8:35 pm   INDICATION: Signs/Symptoms:cough.     COMPARISON: None.   ACCESSION NUMBER(S): [id number]   ORDERING CLINICIAN:  DAMON JOSE   FINDINGS:         CARDIOMEDIASTINAL SILHOUETTE: Cardiomediastinal silhouette is normal in size and configuration.   LUNGS: There is mild bibasilar atelectasis. There is no consolidation or effusion   ABDOMEN: No remarkable upper abdominal findings.   BONES: No acute osseous changes.       1. Mild bibasilar atelectasis       MACRO: None   Signed by: Juan Lloyd 2/15/2025 8:44 PM Dictation workstation:   [id number]           Assessment/Plan   Assessment & Plan  Acute kidney injury superimposed on CKD (CMS-HCC)    Hyperosmolar coma due to type 2 diabetes mellitus (Multi)    Acute pancreatitis    COVID-19    Elevated troponin    Neuro   -Altered mental status   -Multifactorial secondary to shock, COVID, profound volume depletion setting of [facility]   -Sedation with propofol fentanyl as tolerates  ` -CT head negative    Respiratory   -Acute hypoxic respiratory largely driven by aspiration   ` -Patient is intubated    -Check ABG full panel    -Empiric coverage with Rocephin    -Keep SpO2 >92%    Cardiac/hemodynamics   -Undifferentiated shock    -Suspect largely driven by hypovolemic, hemorrhagic shock    -Currently on Levophed, getting resuscitated    -Continue with volume expansion for now pressor support as needed to keep MAP greater than 65    -Lactic acid peak 8.1, currently at 7.5   -Troponin bump, suspect demand ischemia    -Cardiology is consulted, check echocardiogram    GI   -Coffee-ground emesis    -NG in place to low intermittent suction    -GI consulted appreciate their input    -Continue with PPI 40 mg IV twice daily   -Pancreatitis    -CT abdomen completed done without contrast    -Repeat lipase    -Consult surgery    Renal  -Acute on chronic kidney disease, noted for atrophic kidney  -Nephrology is consulted  -Continue with volume expansion  -Electrolytes are okay  -Add SPEP, kappa lambda ratio, CPK    Endocrine  -[facility]   -Ongoing volume expansion, appreciate endocrine's input  -Serum  osmolarity  ID   -COVID, suspect largely aspiration   -Discussed with ID, continue Rocephin    GI/DVT prophylaxis  -PPI, SCD    Discussed with GI, ID, surgical team, primary team    Updated daughter  CODE STATUS DNR CCA okay for intubation      2/16/25: Patient noted with anisocoria with right pupil 5mm and left pupil 3mm. Left pupil 3mm with brisk reaction to light accomodation. Right pupil 5mm and nonreactive. Right eye ptosis and patient unable to spontaneously open eye. Left eye open. No nystagmus. Absent corneal reflex right eye. Positive corneal reflex left eye. Positive cough and gag reflex. Moves all extremities spontaneously. Withdraws to pain in all extremities.  STAT CT head performed and revealed cerebellar stroke. Discussed with telestroke and no BRIE Miranda-CNP  I spent 65 minutes of cumulative critical care time with the patient.  Greater than 50% of that time was spent in the direct collaboration and or coordination of care of the patient.     Dragon dictation software was used to dictate this note and thus there may be minor errors in translation/transcription including garbled speech or misspellings. Please contact for clarification if needed.    3mm. Left pupil 3mm with brisk reaction to light accomodation. Right pupil 5mm and nonreactive. Right eye ptosis and patient unable to spontaneously open right eye. Left eye opens spontaneously. No nystagmus. Absent corneal reflex right eye. Positive corneal reflex left eye. Positive cough and gag reflex. Moves all extremities spontaneously. Withdraws to pain in all extremities.  STAT CT head ordered and performed. Discussed with telestroke neurology, Dr. Ayon while awaiting CT head results. Per Dr. Ayon, appears to have small acute/subacute right cerebellar stroke that was not present on prior CT head from yesterday and recommendations were no TNK given concern for GIB given coffee-ground emesis, no thrombectomy, and once more stable obtain MRI brain. CT head revealed 12mm low-attenuation region in the right cerebellar hemisphere new since recent imaging and suggestive of a small acute/subacute ischemic infarct without mass effect or hemorrhage, questionable region of asymmetric decreased attenuation in the posterior right parietal lobe, and possible acute mild sinusitis and postsurgical changes of the left temporal bone. Extensive discussion with patient's daughter Kerrie about patient's current clinical condition and goals of care. Kerrie to speak to her sister about goals of care and code status but likely leaning towards comfort care given current condition. ABG full panel revealed pH 7.08, pCO2 40, pO2 72, SO2 93, lactate 10.3, base excess -17.5, HCO3 calculated 11.9. 1 L LR bolus ordered, discontinued ceftriaxone, and added meropenem for coverage of possible GI etiology. HHS resolved with calculated serum osmolality 307. Discontinue insulin infusion, 1/2 NS with 20KCL, and dextrose fluids per HHS protocol. Bridge off insulin infusion without Lantus due to concern for possible developing shock liver given  and  which could cause hypoglycemia. Add Humalog #2 sliding scale every 4 hours. POCT  glucose checks every 4 hours. Repeat chest x-ray and KUB. Repeat ABG full panel, BMP, and H&H at midnight.  Consider additional IV fluid bolus pending blood pressure and lactate levels.     ARTUR Vargas    I spent 60 minutes of cumulative critical care time with the patient.  Greater than 50% of that time was spent in the direct collaboration and or coordination of care of the patient.     Dragon dictation software was used to dictate this note and thus there may be minor errors in translation/transcription including garbled speech or misspellings. Please contact for clarification if needed.       Addendum 2/17/25: Wilberto Sy and Breonna present at bedside and updated on patient's status. Went to conference room and had extensive discussion with Kerrie and Breonna about current condition, goals of care, and code status. Explained differenced between DNRCCA and DNR comfort care. All questioned answered and wilberto Sy and Breonna in agreement about wanting to focus on comfort, withdraw life supporting measures, and change code status to comfort care only. Code status changed to DNR Comfort Care Only as per families wishes. Discontinued all non-comfort related medications and added prn dilaudid, ativan, and robinul. Extubation orders placed. RT and bedside nurse notified of plan including code status change, medication changes, and extubation order. Daughters returned to bedside with patient.

## 2025-02-18 LAB
ALBUMIN: 3.9 G/DL (ref 3.4–5)
ALPHA 1 GLOBULIN: 0.3 G/DL (ref 0.2–0.6)
ALPHA 2 GLOBULIN: 0.4 G/DL (ref 0.4–1.1)
BETA GLOBULIN: 0.7 G/DL (ref 0.5–1.2)
GAMMA GLOBULIN: 0.7 G/DL (ref 0.5–1.4)
IMMUNOFIXATION COMMENT: NORMAL
PATH REVIEW - SERUM IMMUNOFIXATION: NORMAL
PATH REVIEW-SERUM PROTEIN ELECTROPHORESIS: NORMAL
PROTEIN ELECTROPHORESIS COMMENT: NORMAL

## 2025-02-18 NOTE — SIGNIFICANT EVENT
Death Within 24 Hours of Soft Wrist Restraint Utilization    Death within 24 hours of soft wrist restraint logged at 2/18/2025 at 9:52 AM.    Yoli Best RN  Date: 2/18/2025  Time: 9:52 AM

## 2025-02-19 LAB
ATRIAL RATE: 138 BPM
P AXIS: 38 DEGREES
PR INTERVAL: 125 MS
Q ONSET: 252 MS
QRS COUNT: 22 BEATS
QRS DURATION: 79 MS
QT INTERVAL: 299 MS
QTC CALCULATION(BAZETT): 455 MS
QTC FREDERICIA: 395 MS
R AXIS: -24 DEGREES
T AXIS: 164 DEGREES
T OFFSET: 402 MS
VENTRICULAR RATE: 139 BPM

## 2025-02-19 NOTE — DISCHARGE SUMMARY
Discharge Diagnosis  Pt     Test Results Pending At Discharge  Pending Labs       Order Current Status    Blood Culture Preliminary result    Blood Culture Preliminary result            Hospital Course      Марина Lozano is a 84 y.o. female with PMHx s/f nonID DM2, (diagnosed ), HTN, HLD, throat cancer () presenting with HHS and pancreatitis COVID 19 infection.  Patient was diagnosed with HHS with blood glucose of 0.48 on admission.  Urine came back negative for ketones.  Patient was initiated with insulin drip.  The patient also had pancreatitis in setting of COVID.  Lipase was 4361 CT imaging consistent with pancreatitis.  GI were also consulted.  Patient also had acute respiratory failure in setting of COVID also had coffee-ground emesis unable to protect her airway she was eventually intubated.  CT of the head was done that revealed cerebellar stroke as well.  TNK was not given given GI bleeding.  Patient was also started on broad-spectrum antibiotics.  Extensive discussions were held with the family.  Goals of cares were discussed.  After discussion the CODE STATUS was changed to DNR comfort care only. Discontinued all non-comfort related medications and added comfort medications. Extubation was performed per the family's wishes.  Patient is pronounced on 25 at 0238     Cause of death:  Cardiopulmonary arrest secondary to shock, COVID 19 infection, pancreatitis, HHS, aspiration pneumonia and GI bleeding.  Also CVA        Outpatient Follow-Up  No future appointments.      Sarah Turner MD

## 2025-02-20 LAB
BACTERIA BLD CULT: NORMAL
BACTERIA BLD CULT: NORMAL

## 2025-02-26 ENCOUNTER — TELEPHONE (OUTPATIENT)
Dept: PRIMARY CARE | Facility: CLINIC | Age: 85
End: 2025-02-26
Payer: COMMERCIAL

## 2025-02-26 NOTE — TELEPHONE ENCOUNTER
Ana from the  home called wanting to know if you would be willing to fill out patient's death certificate. They're having a hard time finding someone to handle this.   LOV 2024 with [facility]

## 2025-04-30 ENCOUNTER — APPOINTMENT (OUTPATIENT)
Dept: ENDOCRINOLOGY | Facility: CLINIC | Age: 85
End: 2025-04-30
Payer: COMMERCIAL

## 2025-05-05 DIAGNOSIS — E11.69 TYPE 2 DIABETES MELLITUS WITH OTHER SPECIFIED COMPLICATION, WITHOUT LONG-TERM CURRENT USE OF INSULIN: ICD-10-CM

## 2025-05-05 RX ORDER — GLIPIZIDE 5 MG/1
5 TABLET ORAL 2 TIMES DAILY
Qty: 180 TABLET | Refills: 3 | OUTPATIENT
Start: 2025-05-05